# Patient Record
Sex: FEMALE | Race: BLACK OR AFRICAN AMERICAN | NOT HISPANIC OR LATINO
[De-identification: names, ages, dates, MRNs, and addresses within clinical notes are randomized per-mention and may not be internally consistent; named-entity substitution may affect disease eponyms.]

---

## 2020-03-10 PROBLEM — Z00.00 ENCOUNTER FOR PREVENTIVE HEALTH EXAMINATION: Status: ACTIVE | Noted: 2020-03-10

## 2020-03-11 ENCOUNTER — APPOINTMENT (OUTPATIENT)
Dept: BARIATRICS | Facility: CLINIC | Age: 45
End: 2020-03-11
Payer: MEDICAID

## 2020-03-11 VITALS
TEMPERATURE: 96.8 F | WEIGHT: 220.25 LBS | BODY MASS INDEX: 34.57 KG/M2 | HEART RATE: 91 BPM | SYSTOLIC BLOOD PRESSURE: 128 MMHG | DIASTOLIC BLOOD PRESSURE: 87 MMHG | HEIGHT: 67 IN | OXYGEN SATURATION: 97 %

## 2020-03-11 DIAGNOSIS — N39.3 STRESS INCONTINENCE (FEMALE) (MALE): ICD-10-CM

## 2020-03-11 DIAGNOSIS — M15.9 POLYOSTEOARTHRITIS, UNSPECIFIED: ICD-10-CM

## 2020-03-11 DIAGNOSIS — J45.20 MILD INTERMITTENT ASTHMA, UNCOMPLICATED: ICD-10-CM

## 2020-03-11 PROCEDURE — 99203 OFFICE O/P NEW LOW 30 MIN: CPT

## 2020-03-12 ENCOUNTER — APPOINTMENT (OUTPATIENT)
Dept: BARIATRICS | Facility: CLINIC | Age: 45
End: 2020-03-12

## 2020-03-23 ENCOUNTER — APPOINTMENT (OUTPATIENT)
Dept: BARIATRICS | Facility: CLINIC | Age: 45
End: 2020-03-23
Payer: MEDICAID

## 2020-03-23 PROCEDURE — 97803 MED NUTRITION INDIV SUBSEQ: CPT

## 2020-03-23 PROCEDURE — 98968 PH1 ASSMT&MGMT NQHP 21-30: CPT

## 2020-04-21 PROBLEM — N39.3 URINARY, INCONTINENCE, STRESS FEMALE: Status: ACTIVE | Noted: 2020-04-21

## 2020-04-21 PROBLEM — J45.20 MILD INTERMITTENT ASTHMA IN ADULT WITHOUT COMPLICATION: Status: ACTIVE | Noted: 2020-04-21

## 2020-04-21 PROBLEM — M15.9 OSTEOARTHRITIS OF MULTIPLE JOINTS, UNSPECIFIED OSTEOARTHRITIS TYPE: Status: ACTIVE | Noted: 2020-04-21

## 2020-04-21 NOTE — HISTORY OF PRESENT ILLNESS
[de-identified] : Patient is a 44 year old female with along history of morbid obesity not responsive to multiple dietary regimens. She has a BMI of 35 and weight-related comorbidities including hypertension, asthma, osteoarthritis and stress urinary incontinence. She is interested in weight loss surgery.

## 2020-04-21 NOTE — ASSESSMENT
[FreeTextEntry1] : She meets the NIH criteria for bariatric surgery and would like to have a laparoscopic sleeve gastrectomy. I have reviewed the risks and benefits of the procedure with the patient, and she understands this information fully.

## 2020-04-24 ENCOUNTER — APPOINTMENT (OUTPATIENT)
Dept: BARIATRICS | Facility: CLINIC | Age: 45
End: 2020-04-24
Payer: MEDICAID

## 2020-04-24 PROCEDURE — 98968 PH1 ASSMT&MGMT NQHP 21-30: CPT

## 2020-04-24 PROCEDURE — 97803 MED NUTRITION INDIV SUBSEQ: CPT

## 2020-05-01 ENCOUNTER — APPOINTMENT (OUTPATIENT)
Dept: BARIATRICS | Facility: CLINIC | Age: 45
End: 2020-05-01

## 2020-06-23 ENCOUNTER — APPOINTMENT (OUTPATIENT)
Dept: RADIOLOGY | Facility: HOSPITAL | Age: 45
End: 2020-06-23

## 2020-07-06 ENCOUNTER — RESULT REVIEW (OUTPATIENT)
Age: 45
End: 2020-07-06

## 2020-07-06 ENCOUNTER — LABORATORY RESULT (OUTPATIENT)
Age: 45
End: 2020-07-06

## 2020-07-06 ENCOUNTER — OUTPATIENT (OUTPATIENT)
Dept: OUTPATIENT SERVICES | Facility: HOSPITAL | Age: 45
LOS: 1 days | End: 2020-07-06
Payer: MEDICAID

## 2020-07-06 PROCEDURE — 71046 X-RAY EXAM CHEST 2 VIEWS: CPT

## 2020-07-06 PROCEDURE — 71046 X-RAY EXAM CHEST 2 VIEWS: CPT | Mod: 26

## 2020-07-06 PROCEDURE — 74240 X-RAY XM UPR GI TRC 1CNTRST: CPT

## 2020-07-06 PROCEDURE — 74240 X-RAY XM UPR GI TRC 1CNTRST: CPT | Mod: 26

## 2020-07-10 ENCOUNTER — APPOINTMENT (OUTPATIENT)
Dept: BARIATRICS | Facility: CLINIC | Age: 45
End: 2020-07-10

## 2020-07-14 ENCOUNTER — APPOINTMENT (OUTPATIENT)
Dept: ENDOCRINOLOGY | Facility: CLINIC | Age: 45
End: 2020-07-14
Payer: MEDICAID

## 2020-07-14 VITALS
SYSTOLIC BLOOD PRESSURE: 124 MMHG | HEIGHT: 66.5 IN | HEART RATE: 87 BPM | WEIGHT: 225 LBS | DIASTOLIC BLOOD PRESSURE: 81 MMHG | BODY MASS INDEX: 35.73 KG/M2

## 2020-07-14 DIAGNOSIS — E03.9 HYPOTHYROIDISM, UNSPECIFIED: ICD-10-CM

## 2020-07-14 DIAGNOSIS — Z78.9 OTHER SPECIFIED HEALTH STATUS: ICD-10-CM

## 2020-07-14 DIAGNOSIS — E04.9 NONTOXIC GOITER, UNSPECIFIED: ICD-10-CM

## 2020-07-14 DIAGNOSIS — Z87.891 PERSONAL HISTORY OF NICOTINE DEPENDENCE: ICD-10-CM

## 2020-07-14 PROCEDURE — 36415 COLL VENOUS BLD VENIPUNCTURE: CPT

## 2020-07-14 PROCEDURE — 99205 OFFICE O/P NEW HI 60 MIN: CPT | Mod: 25

## 2020-07-15 ENCOUNTER — APPOINTMENT (OUTPATIENT)
Dept: BARIATRICS | Facility: CLINIC | Age: 45
End: 2020-07-15
Payer: MEDICAID

## 2020-07-15 PROCEDURE — 98968 PH1 ASSMT&MGMT NQHP 21-30: CPT

## 2020-07-17 VITALS — BODY MASS INDEX: 35.89 KG/M2 | HEIGHT: 66.5 IN | WEIGHT: 226 LBS

## 2020-07-17 PROBLEM — Z87.891 FORMER SMOKER: Status: ACTIVE | Noted: 2020-07-17

## 2020-07-17 PROBLEM — Z78.9 ALCOHOL CONSUMPTION ONE TO TWO DAYS PER WEEK: Status: ACTIVE | Noted: 2020-07-17

## 2020-07-17 LAB
T3 SERPL-MCNC: 89 NG/DL
T4 FREE SERPL-MCNC: 0.5 NG/DL
THYROGLOB AB SERPL-ACNC: <20 IU/ML
THYROPEROXIDASE AB SERPL IA-ACNC: 238 IU/ML
TSH RECEPTOR AB: <1.1 IU/L
TSH SERPL-ACNC: 6.85 UIU/ML
TSI ACT/NOR SER: <0.1 IU/L

## 2020-07-17 RX ORDER — LEVOTHYROXINE SODIUM 0.15 MG/1
150 TABLET ORAL DAILY
Qty: 90 | Refills: 2 | Status: ACTIVE | COMMUNITY
Start: 2020-07-17 | End: 1900-01-01

## 2020-07-17 NOTE — PHYSICAL EXAM
[Alert] : alert [Well Nourished] : well nourished [No Acute Distress] : no acute distress [Well Developed] : well developed [Normal Sclera/Conjunctiva] : normal sclera/conjunctiva [EOMI] : extra ocular movement intact [No Proptosis] : no proptosis [Normal Oropharynx] : the oropharynx was normal [No LAD] : no lymphadenopathy [No Respiratory Distress] : no respiratory distress [No Accessory Muscle Use] : no accessory muscle use [Clear to Auscultation] : lungs were clear to auscultation bilaterally [Normal S1, S2] : normal S1 and S2 [No Edema] : no peripheral edema [Normal Rate] : heart rate was normal [Regular Rhythm] : with a regular rhythm [Pedal Pulses Normal] : the pedal pulses are present [Normal Bowel Sounds] : normal bowel sounds [Not Distended] : not distended [Soft] : abdomen soft [Not Tender] : non-tender [Normal Anterior Cervical Nodes] : no anterior cervical lymphadenopathy [Normal Posterior Cervical Nodes] : no posterior cervical lymphadenopathy [No Spinal Tenderness] : no spinal tenderness [Spine Straight] : spine straight [No Stigmata of Cushings Syndrome] : no stigmata of Cushings Syndrome [Normal Gait] : normal gait [Normal Strength/Tone] : muscle strength and tone were normal [No Rash] : no rash [Acanthosis Nigricans] : no acanthosis nigricans [Normal Reflexes] : deep tendon reflexes were 2+ and symmetric [No Tremors] : no tremors [Oriented x3] : oriented to person, place, and time [de-identified] : palpable non tender diffuse goiter

## 2020-07-17 NOTE — HISTORY OF PRESENT ILLNESS
[FreeTextEntry1] : 43 y/o F w/ Hx of obesity and HTN\par here for initial evaluation and management of thyroid complaints\par generally feels well and endorses no acute complaints.\par reports dramatic weight gain of ~ 60 lb over the last 6 years. reports no clear trigger. notes physical activity has redued over the pandemic. is presently undergoing bariatric surgery evaluation. No TFTs available for interpretation. notes she has been told about having a goiter in the past. She otherwise denies any f/c, CP, SOB, palpitations, tremors, depressed mood, anxiety, palpitations, n/v, stool/urinary abn, skin/weight changes, heat/cold intolerance, HAs, breast/nipple changes, polyuria/polydipsia/nocturia or other complaints.\par she again denies any dysphagia, hoarseness, neck tenderness or new palpable masses. she again denies any family history of thyroid disorders or personal exposure to ionizing radiation.\par \par

## 2020-07-17 NOTE — ASSESSMENT
[FreeTextEntry1] : Abnormal TFTs\par no recent amiodarone/steroid/lithium or contrast exposure. denies biotin intake. repeat TFTs and antibody levels. refer for dedicated US.\par \par 1) Obesity, Morbid: Class I, complicated by severe DJD. High risk of metabolic syndrome and future complications. Discussed options including meds, bariatric surgery and lifestyle modification. RB and alternatives discussed. Questions answered and she verbalized understanding. Refer to nutrition and start hypocaloric, hypocarb diet in addition to exercise regimen. Refer to  now. Some weight loss (3 lbs). If no 5-7% weight loss observed on f/u, will consider belviq initiation.\par

## 2020-07-21 ENCOUNTER — FORM ENCOUNTER (OUTPATIENT)
Age: 45
End: 2020-07-21

## 2020-08-12 ENCOUNTER — APPOINTMENT (OUTPATIENT)
Dept: BARIATRICS | Facility: CLINIC | Age: 45
End: 2020-08-12
Payer: MEDICAID

## 2020-08-12 VITALS
HEIGHT: 66.5 IN | HEART RATE: 91 BPM | SYSTOLIC BLOOD PRESSURE: 120 MMHG | DIASTOLIC BLOOD PRESSURE: 85 MMHG | BODY MASS INDEX: 35.89 KG/M2 | WEIGHT: 226 LBS | OXYGEN SATURATION: 96 % | TEMPERATURE: 97.6 F

## 2020-08-12 PROCEDURE — 99213 OFFICE O/P EST LOW 20 MIN: CPT

## 2020-08-12 NOTE — HISTORY OF PRESENT ILLNESS
[de-identified] : Patient is scheduled for a laparoscopic sleeve gastrectomy. She has been medically cleared and her upper GI study is normal.

## 2020-08-12 NOTE — PHYSICAL EXAM
[Obese, well nourished, in no acute distress] : obese, well nourished, in no acute distress [Normal] : affect appropriate [de-identified] : dastasis recti. well healed lower midline scar

## 2020-08-12 NOTE — ASSESSMENT
[FreeTextEntry1] : I have reviewed the risks and benefits of the procedure with the patient, and she understands this information fully.

## 2020-08-15 ENCOUNTER — LABORATORY RESULT (OUTPATIENT)
Age: 45
End: 2020-08-15

## 2020-08-17 ENCOUNTER — TRANSCRIPTION ENCOUNTER (OUTPATIENT)
Age: 45
End: 2020-08-17

## 2020-08-17 VITALS
DIASTOLIC BLOOD PRESSURE: 78 MMHG | SYSTOLIC BLOOD PRESSURE: 123 MMHG | HEART RATE: 85 BPM | OXYGEN SATURATION: 96 % | RESPIRATION RATE: 16 BRPM | WEIGHT: 228.62 LBS | HEIGHT: 67 IN | TEMPERATURE: 99 F

## 2020-08-17 RX ORDER — ENOXAPARIN SODIUM 100 MG/ML
40 INJECTION SUBCUTANEOUS ONCE
Refills: 0 | Status: COMPLETED | OUTPATIENT
Start: 2020-08-18 | End: 2020-08-18

## 2020-08-18 ENCOUNTER — APPOINTMENT (OUTPATIENT)
Dept: BARIATRICS | Facility: HOSPITAL | Age: 45
End: 2020-08-18

## 2020-08-18 ENCOUNTER — RESULT REVIEW (OUTPATIENT)
Age: 45
End: 2020-08-18

## 2020-08-18 ENCOUNTER — INPATIENT (INPATIENT)
Facility: HOSPITAL | Age: 45
LOS: 4 days | Discharge: HOME | DRG: 621 | End: 2020-08-23
Attending: SURGERY | Admitting: SURGERY
Payer: MEDICAID

## 2020-08-18 DIAGNOSIS — Z41.9 ENCOUNTER FOR PROCEDURE FOR PURPOSES OTHER THAN REMEDYING HEALTH STATE, UNSPECIFIED: Chronic | ICD-10-CM

## 2020-08-18 DIAGNOSIS — Z98.891 HISTORY OF UTERINE SCAR FROM PREVIOUS SURGERY: Chronic | ICD-10-CM

## 2020-08-18 DIAGNOSIS — Z90.710 ACQUIRED ABSENCE OF BOTH CERVIX AND UTERUS: Chronic | ICD-10-CM

## 2020-08-18 DIAGNOSIS — Z98.890 OTHER SPECIFIED POSTPROCEDURAL STATES: Chronic | ICD-10-CM

## 2020-08-18 PROCEDURE — 88307 TISSUE EXAM BY PATHOLOGIST: CPT | Mod: 26

## 2020-08-18 PROCEDURE — 43775 LAP SLEEVE GASTRECTOMY: CPT | Mod: GC

## 2020-08-18 RX ORDER — ACETAMINOPHEN 500 MG
1000 TABLET ORAL ONCE
Refills: 0 | Status: COMPLETED | OUTPATIENT
Start: 2020-08-18 | End: 2020-08-20

## 2020-08-18 RX ORDER — SCOPALAMINE 1 MG/3D
1 PATCH, EXTENDED RELEASE TRANSDERMAL ONCE
Refills: 0 | Status: COMPLETED | OUTPATIENT
Start: 2020-08-18 | End: 2020-08-18

## 2020-08-18 RX ORDER — HYDROMORPHONE HYDROCHLORIDE 2 MG/ML
0.5 INJECTION INTRAMUSCULAR; INTRAVENOUS; SUBCUTANEOUS ONCE
Refills: 0 | Status: DISCONTINUED | OUTPATIENT
Start: 2020-08-18 | End: 2020-08-19

## 2020-08-18 RX ORDER — PANTOPRAZOLE SODIUM 20 MG/1
40 TABLET, DELAYED RELEASE ORAL DAILY
Refills: 0 | Status: DISCONTINUED | OUTPATIENT
Start: 2020-08-18 | End: 2020-08-23

## 2020-08-18 RX ORDER — GABAPENTIN 400 MG/1
300 CAPSULE ORAL ONCE
Refills: 0 | Status: COMPLETED | OUTPATIENT
Start: 2020-08-18 | End: 2020-08-18

## 2020-08-18 RX ORDER — APREPITANT 80 MG/1
40 CAPSULE ORAL ONCE
Refills: 0 | Status: COMPLETED | OUTPATIENT
Start: 2020-08-18 | End: 2020-08-18

## 2020-08-18 RX ORDER — ONDANSETRON 8 MG/1
4 TABLET, FILM COATED ORAL EVERY 6 HOURS
Refills: 0 | Status: DISCONTINUED | OUTPATIENT
Start: 2020-08-18 | End: 2020-08-23

## 2020-08-18 RX ORDER — AMLODIPINE BESYLATE 2.5 MG/1
1 TABLET ORAL
Qty: 0 | Refills: 0 | DISCHARGE

## 2020-08-18 RX ORDER — IPRATROPIUM/ALBUTEROL SULFATE 18-103MCG
0 AEROSOL WITH ADAPTER (GRAM) INHALATION
Qty: 0 | Refills: 0 | DISCHARGE

## 2020-08-18 RX ORDER — ACETAMINOPHEN 500 MG
1000 TABLET ORAL ONCE
Refills: 0 | Status: COMPLETED | OUTPATIENT
Start: 2020-08-18 | End: 2020-08-18

## 2020-08-18 RX ORDER — ALBUTEROL 90 UG/1
0 AEROSOL, METERED ORAL
Qty: 0 | Refills: 0 | DISCHARGE

## 2020-08-18 RX ORDER — BUPIVACAINE 13.3 MG/ML
20 INJECTION, SUSPENSION, LIPOSOMAL INFILTRATION ONCE
Refills: 0 | Status: DISCONTINUED | OUTPATIENT
Start: 2020-08-18 | End: 2020-08-18

## 2020-08-18 RX ORDER — OXYCODONE HYDROCHLORIDE 5 MG/1
0 TABLET ORAL
Qty: 0 | Refills: 0 | DISCHARGE

## 2020-08-18 RX ORDER — SODIUM CHLORIDE 9 MG/ML
1000 INJECTION, SOLUTION INTRAVENOUS
Refills: 0 | Status: DISCONTINUED | OUTPATIENT
Start: 2020-08-18 | End: 2020-08-19

## 2020-08-18 RX ORDER — ACETAMINOPHEN 500 MG
650 TABLET ORAL EVERY 6 HOURS
Refills: 0 | Status: DISCONTINUED | OUTPATIENT
Start: 2020-08-18 | End: 2020-08-23

## 2020-08-18 RX ORDER — HYDROMORPHONE HYDROCHLORIDE 2 MG/ML
0.25 INJECTION INTRAMUSCULAR; INTRAVENOUS; SUBCUTANEOUS
Refills: 0 | Status: DISCONTINUED | OUTPATIENT
Start: 2020-08-18 | End: 2020-08-18

## 2020-08-18 RX ORDER — LEVOTHYROXINE SODIUM 125 MCG
1 TABLET ORAL
Qty: 0 | Refills: 0 | DISCHARGE

## 2020-08-18 RX ADMIN — ENOXAPARIN SODIUM 40 MILLIGRAM(S): 100 INJECTION SUBCUTANEOUS at 15:06

## 2020-08-18 RX ADMIN — HYDROMORPHONE HYDROCHLORIDE 0.25 MILLIGRAM(S): 2 INJECTION INTRAMUSCULAR; INTRAVENOUS; SUBCUTANEOUS at 20:15

## 2020-08-18 RX ADMIN — Medication 400 MILLIGRAM(S): at 22:32

## 2020-08-18 RX ADMIN — SODIUM CHLORIDE 150 MILLILITER(S): 9 INJECTION, SOLUTION INTRAVENOUS at 21:27

## 2020-08-18 RX ADMIN — Medication 1000 MILLIGRAM(S): at 13:11

## 2020-08-18 RX ADMIN — SCOPALAMINE 1 PATCH: 1 PATCH, EXTENDED RELEASE TRANSDERMAL at 13:12

## 2020-08-18 RX ADMIN — APREPITANT 40 MILLIGRAM(S): 80 CAPSULE ORAL at 13:11

## 2020-08-18 RX ADMIN — GABAPENTIN 300 MILLIGRAM(S): 400 CAPSULE ORAL at 13:12

## 2020-08-18 RX ADMIN — PANTOPRAZOLE SODIUM 40 MILLIGRAM(S): 20 TABLET, DELAYED RELEASE ORAL at 21:31

## 2020-08-18 RX ADMIN — SCOPALAMINE 1 PATCH: 1 PATCH, EXTENDED RELEASE TRANSDERMAL at 20:22

## 2020-08-18 RX ADMIN — Medication 1000 MILLIGRAM(S): at 13:49

## 2020-08-18 RX ADMIN — Medication 1000 MILLIGRAM(S): at 23:32

## 2020-08-18 RX ADMIN — HYDROMORPHONE HYDROCHLORIDE 0.25 MILLIGRAM(S): 2 INJECTION INTRAMUSCULAR; INTRAVENOUS; SUBCUTANEOUS at 20:30

## 2020-08-18 RX ADMIN — ONDANSETRON 4 MILLIGRAM(S): 8 TABLET, FILM COATED ORAL at 20:44

## 2020-08-18 NOTE — H&P ADULT - NSHPPHYSICALEXAM_GEN_ALL_CORE
Neuro: Alert and Oriented X 4  Respiratory: CTA b/l. no respiratory distress  Cardiovascular: NSR, RRR  Gastrointestinal: obese, soft, NT/ND  Extremities: (-) edema b/l

## 2020-08-18 NOTE — H&P ADULT - HISTORY OF PRESENT ILLNESS
Pt is a 43 y/o F w/ PMH of hypothyroidism, HTN, asthma and Morbid obesity presents today for robotic assisted sleeve gastrectomy.     pre op h/h: 13.0/39.5  Cr: .77

## 2020-08-18 NOTE — H&P ADULT - NSICDXPASTMEDICALHX_GEN_ALL_CORE_FT
PAST MEDICAL HISTORY:  Acute cystitis     Alcohol abuse pt denies    Asthma     Back pain     Genital herpes pt denies    HTN (hypertension)     Hypothyroid     Migraines     Morbid obesity     OA (osteoarthritis)     Uterine fibroid

## 2020-08-18 NOTE — H&P ADULT - ASSESSMENT
Pt is a 43 y/o F w/ PMH of hypothyroidism, HTN, asthma and Morbid obesity presents today for robotic assisted sleeve gastrectomy.   - to OR

## 2020-08-18 NOTE — BRIEF OPERATIVE NOTE - OPERATION/FINDINGS
Stomach divided along Bougie edge using robotic stapler. Omentum sutured to greater curvature of sleeved stomach. Hemostasis achieved. Fascia at 15mm post site closed. Port sites closed w/ 4-0 Monocryl sutures & surgical glue.

## 2020-08-18 NOTE — PROGRESS NOTE ADULT - SUBJECTIVE AND OBJECTIVE BOX
POST-OPERATIVE NOTE    Procedure: Laparoscopic sleeve gastrectomy    Diagnosis/Indication: morbid obesity    Surgeon: Dr. Kowalski    S: Pt c/o pain in the epigastric region. Denies CP, and SOB. Pain controlled with Tylenol only due to the fact that patient was difficult to arouse in PACU (sternal rub). Dilaudid dc'd. Denies nausea, vomiting.    O:  T(C): 36.2 (08-18-20 @ 21:05), Max: 36.8 (08-18-20 @ 19:50)  T(F): 97.1 (08-18-20 @ 21:05), Max: 98.2 (08-18-20 @ 19:50)  HR: 80 (08-18-20 @ 21:20) (71 - 85)  BP: 124/70 (08-18-20 @ 21:20) (113/61 - 128/67)  RR: 14 (08-18-20 @ 21:20) (13 - 21)  SpO2: 98% (08-18-20 @ 21:20) (95% - 100%)  Wt(kg): --    Gen: NAD, resting comfortably in bed  C/V: NSR  Pulm: Nonlabored breathing, no respiratory distress  Abd: soft, NT/ND, incision areas are clean, dry and intact  Extrem: WWP, no calf edema or tenderness, SCDs in place    A/P: 44y Female s/p above procedure  Diet: BCLD  IVF: LR @ 150cc/hr  Pain/nausea control  SCDs/OOBA/IS  Dispo pending pain control, PO tolerance, clinical improvement POST-OPERATIVE NOTE    Procedure: Laparoscopic sleeve gastrectomy    Diagnosis/Indication: morbid obesity    Surgeon: Dr. Kowalski    S: Pt c/o pain in the epigastric region. Denies CP, and SOB. Pain controlled with Tylenol only, due to the fact that patient was difficult to arouse in PACU (sternal rub). Dilaudid dc'd. Denies nausea, vomiting.    O:  T(C): 36.2 (08-18-20 @ 21:05), Max: 36.8 (08-18-20 @ 19:50)  T(F): 97.1 (08-18-20 @ 21:05), Max: 98.2 (08-18-20 @ 19:50)  HR: 80 (08-18-20 @ 21:20) (71 - 85)  BP: 124/70 (08-18-20 @ 21:20) (113/61 - 128/67)  RR: 14 (08-18-20 @ 21:20) (13 - 21)  SpO2: 98% (08-18-20 @ 21:20) (95% - 100%)  Wt(kg): --    Gen: NAD, resting comfortably in bed  C/V: NSR  Pulm: Nonlabored breathing, no respiratory distress  Abd: soft, NT/ND, incision areas are clean, dry and intact  Extrem: WWP, no calf edema or tenderness, SCDs in place    A/P: 44y Female s/p above procedure  Diet: BCLD  IVF: LR @ 150cc/hr  Pain/nausea control  SCDs/OOBA/IS  Dispo pending pain control, PO tolerance, clinical improvement

## 2020-08-18 NOTE — H&P ADULT - NSICDXPASTSURGICALHX_GEN_ALL_CORE_FT
PAST SURGICAL HISTORY:  S/P  x3    S/P hysterectomy partial    S/P myomectomy     Surgery, elective total hip replacement, left    Surgery, elective b/l breast reduction x2

## 2020-08-18 NOTE — PACU DISCHARGE NOTE - COMMENTS
Patient is A&OX4. Still with some discomfort from gas. VSS. Tolerated PO clear. Met PACU requirements. Seen by PA and plan of care discussed with patient. LR at 150cc/hr. Voided 650cc. clear urine. Abdomen soft with 6 lap sites closed with dermabond. Report given to floor RN. Patient taken down to room in NAD by transporter

## 2020-08-19 ENCOUNTER — TRANSCRIPTION ENCOUNTER (OUTPATIENT)
Age: 45
End: 2020-08-19

## 2020-08-19 LAB
ALBUMIN SERPL ELPH-MCNC: 3.8 G/DL — SIGNIFICANT CHANGE UP (ref 3.3–5)
ALP SERPL-CCNC: 39 U/L — LOW (ref 40–120)
ALT FLD-CCNC: 51 U/L — HIGH (ref 10–45)
ANION GAP SERPL CALC-SCNC: 11 MMOL/L — SIGNIFICANT CHANGE UP (ref 5–17)
ANION GAP SERPL CALC-SCNC: 12 MMOL/L — SIGNIFICANT CHANGE UP (ref 5–17)
ANION GAP SERPL CALC-SCNC: 13 MMOL/L — SIGNIFICANT CHANGE UP (ref 5–17)
APTT BLD: 23.5 SEC — LOW (ref 27.5–35.5)
AST SERPL-CCNC: 65 U/L — HIGH (ref 10–40)
BILIRUB SERPL-MCNC: 0.4 MG/DL — SIGNIFICANT CHANGE UP (ref 0.2–1.2)
BUN SERPL-MCNC: 10 MG/DL — SIGNIFICANT CHANGE UP (ref 7–23)
BUN SERPL-MCNC: 7 MG/DL — SIGNIFICANT CHANGE UP (ref 7–23)
BUN SERPL-MCNC: 8 MG/DL — SIGNIFICANT CHANGE UP (ref 7–23)
CALCIUM SERPL-MCNC: 8.7 MG/DL — SIGNIFICANT CHANGE UP (ref 8.4–10.5)
CALCIUM SERPL-MCNC: 8.9 MG/DL — SIGNIFICANT CHANGE UP (ref 8.4–10.5)
CALCIUM SERPL-MCNC: 9 MG/DL — SIGNIFICANT CHANGE UP (ref 8.4–10.5)
CHLORIDE SERPL-SCNC: 100 MMOL/L — SIGNIFICANT CHANGE UP (ref 96–108)
CHLORIDE SERPL-SCNC: 99 MMOL/L — SIGNIFICANT CHANGE UP (ref 96–108)
CHLORIDE SERPL-SCNC: 99 MMOL/L — SIGNIFICANT CHANGE UP (ref 96–108)
CO2 SERPL-SCNC: 19 MMOL/L — LOW (ref 22–31)
CO2 SERPL-SCNC: 19 MMOL/L — LOW (ref 22–31)
CO2 SERPL-SCNC: 22 MMOL/L — SIGNIFICANT CHANGE UP (ref 22–31)
CREAT SERPL-MCNC: 0.52 MG/DL — SIGNIFICANT CHANGE UP (ref 0.5–1.3)
CREAT SERPL-MCNC: 0.65 MG/DL — SIGNIFICANT CHANGE UP (ref 0.5–1.3)
CREAT SERPL-MCNC: 0.76 MG/DL — SIGNIFICANT CHANGE UP (ref 0.5–1.3)
GLUCOSE BLDC GLUCOMTR-MCNC: 163 MG/DL — HIGH (ref 70–99)
GLUCOSE SERPL-MCNC: 146 MG/DL — HIGH (ref 70–99)
GLUCOSE SERPL-MCNC: 156 MG/DL — HIGH (ref 70–99)
GLUCOSE SERPL-MCNC: 171 MG/DL — HIGH (ref 70–99)
HCT VFR BLD CALC: 25.7 % — LOW (ref 34.5–45)
HCT VFR BLD CALC: 25.8 % — LOW (ref 34.5–45)
HCT VFR BLD CALC: 27.9 % — LOW (ref 34.5–45)
HCT VFR BLD CALC: 28.3 % — LOW (ref 34.5–45)
HCT VFR BLD CALC: 31.1 % — LOW (ref 34.5–45)
HGB BLD-MCNC: 10.3 G/DL — LOW (ref 11.5–15.5)
HGB BLD-MCNC: 8.4 G/DL — LOW (ref 11.5–15.5)
HGB BLD-MCNC: 8.7 G/DL — LOW (ref 11.5–15.5)
HGB BLD-MCNC: 9.3 G/DL — LOW (ref 11.5–15.5)
HGB BLD-MCNC: 9.7 G/DL — LOW (ref 11.5–15.5)
INR BLD: 1.08 — SIGNIFICANT CHANGE UP (ref 0.88–1.16)
LACTATE SERPL-SCNC: 1.7 MMOL/L — SIGNIFICANT CHANGE UP (ref 0.5–2)
LACTATE SERPL-SCNC: 2.7 MMOL/L — HIGH (ref 0.5–2)
MAGNESIUM SERPL-MCNC: 1.7 MG/DL — SIGNIFICANT CHANGE UP (ref 1.6–2.6)
MAGNESIUM SERPL-MCNC: 1.8 MG/DL — SIGNIFICANT CHANGE UP (ref 1.6–2.6)
MAGNESIUM SERPL-MCNC: 2.6 MG/DL — SIGNIFICANT CHANGE UP (ref 1.6–2.6)
MCHC RBC-ENTMCNC: 29.6 PG — SIGNIFICANT CHANGE UP (ref 27–34)
MCHC RBC-ENTMCNC: 29.8 PG — SIGNIFICANT CHANGE UP (ref 27–34)
MCHC RBC-ENTMCNC: 30.3 PG — SIGNIFICANT CHANGE UP (ref 27–34)
MCHC RBC-ENTMCNC: 30.4 PG — SIGNIFICANT CHANGE UP (ref 27–34)
MCHC RBC-ENTMCNC: 30.9 PG — SIGNIFICANT CHANGE UP (ref 27–34)
MCHC RBC-ENTMCNC: 32.7 GM/DL — SIGNIFICANT CHANGE UP (ref 32–36)
MCHC RBC-ENTMCNC: 33.1 GM/DL — SIGNIFICANT CHANGE UP (ref 32–36)
MCHC RBC-ENTMCNC: 33.3 GM/DL — SIGNIFICANT CHANGE UP (ref 32–36)
MCHC RBC-ENTMCNC: 33.7 GM/DL — SIGNIFICANT CHANGE UP (ref 32–36)
MCHC RBC-ENTMCNC: 34.3 GM/DL — SIGNIFICANT CHANGE UP (ref 32–36)
MCV RBC AUTO: 87.1 FL — SIGNIFICANT CHANGE UP (ref 80–100)
MCV RBC AUTO: 89.4 FL — SIGNIFICANT CHANGE UP (ref 80–100)
MCV RBC AUTO: 90.2 FL — SIGNIFICANT CHANGE UP (ref 80–100)
MCV RBC AUTO: 92.7 FL — SIGNIFICANT CHANGE UP (ref 80–100)
MCV RBC AUTO: 92.8 FL — SIGNIFICANT CHANGE UP (ref 80–100)
NRBC # BLD: 0 /100 WBCS — SIGNIFICANT CHANGE UP (ref 0–0)
PHOSPHATE SERPL-MCNC: 2.6 MG/DL — SIGNIFICANT CHANGE UP (ref 2.5–4.5)
PHOSPHATE SERPL-MCNC: 2.8 MG/DL — SIGNIFICANT CHANGE UP (ref 2.5–4.5)
PHOSPHATE SERPL-MCNC: 3.1 MG/DL — SIGNIFICANT CHANGE UP (ref 2.5–4.5)
PLATELET # BLD AUTO: 178 K/UL — SIGNIFICANT CHANGE UP (ref 150–400)
PLATELET # BLD AUTO: 194 K/UL — SIGNIFICANT CHANGE UP (ref 150–400)
PLATELET # BLD AUTO: 216 K/UL — SIGNIFICANT CHANGE UP (ref 150–400)
PLATELET # BLD AUTO: 219 K/UL — SIGNIFICANT CHANGE UP (ref 150–400)
PLATELET # BLD AUTO: 222 K/UL — SIGNIFICANT CHANGE UP (ref 150–400)
POTASSIUM SERPL-MCNC: 4.4 MMOL/L — SIGNIFICANT CHANGE UP (ref 3.5–5.3)
POTASSIUM SERPL-MCNC: 4.5 MMOL/L — SIGNIFICANT CHANGE UP (ref 3.5–5.3)
POTASSIUM SERPL-MCNC: 4.6 MMOL/L — SIGNIFICANT CHANGE UP (ref 3.5–5.3)
POTASSIUM SERPL-SCNC: 4.4 MMOL/L — SIGNIFICANT CHANGE UP (ref 3.5–5.3)
POTASSIUM SERPL-SCNC: 4.5 MMOL/L — SIGNIFICANT CHANGE UP (ref 3.5–5.3)
POTASSIUM SERPL-SCNC: 4.6 MMOL/L — SIGNIFICANT CHANGE UP (ref 3.5–5.3)
PROT SERPL-MCNC: 6.6 G/DL — SIGNIFICANT CHANGE UP (ref 6–8.3)
PROTHROM AB SERPL-ACNC: 12.9 SEC — SIGNIFICANT CHANGE UP (ref 10.6–13.6)
RBC # BLD: 2.77 M/UL — LOW (ref 3.8–5.2)
RBC # BLD: 2.86 M/UL — LOW (ref 3.8–5.2)
RBC # BLD: 3.01 M/UL — LOW (ref 3.8–5.2)
RBC # BLD: 3.25 M/UL — LOW (ref 3.8–5.2)
RBC # BLD: 3.48 M/UL — LOW (ref 3.8–5.2)
RBC # FLD: 13.6 % — SIGNIFICANT CHANGE UP (ref 10.3–14.5)
RBC # FLD: 13.8 % — SIGNIFICANT CHANGE UP (ref 10.3–14.5)
RBC # FLD: 14 % — SIGNIFICANT CHANGE UP (ref 10.3–14.5)
RBC # FLD: 14 % — SIGNIFICANT CHANGE UP (ref 10.3–14.5)
RBC # FLD: 14.1 % — SIGNIFICANT CHANGE UP (ref 10.3–14.5)
SODIUM SERPL-SCNC: 130 MMOL/L — LOW (ref 135–145)
SODIUM SERPL-SCNC: 131 MMOL/L — LOW (ref 135–145)
SODIUM SERPL-SCNC: 133 MMOL/L — LOW (ref 135–145)
WBC # BLD: 10.08 K/UL — SIGNIFICANT CHANGE UP (ref 3.8–10.5)
WBC # BLD: 10.23 K/UL — SIGNIFICANT CHANGE UP (ref 3.8–10.5)
WBC # BLD: 6.85 K/UL — SIGNIFICANT CHANGE UP (ref 3.8–10.5)
WBC # BLD: 9.3 K/UL — SIGNIFICANT CHANGE UP (ref 3.8–10.5)
WBC # BLD: 9.61 K/UL — SIGNIFICANT CHANGE UP (ref 3.8–10.5)
WBC # FLD AUTO: 10.08 K/UL — SIGNIFICANT CHANGE UP (ref 3.8–10.5)
WBC # FLD AUTO: 10.23 K/UL — SIGNIFICANT CHANGE UP (ref 3.8–10.5)
WBC # FLD AUTO: 6.85 K/UL — SIGNIFICANT CHANGE UP (ref 3.8–10.5)
WBC # FLD AUTO: 9.3 K/UL — SIGNIFICANT CHANGE UP (ref 3.8–10.5)
WBC # FLD AUTO: 9.61 K/UL — SIGNIFICANT CHANGE UP (ref 3.8–10.5)

## 2020-08-19 PROCEDURE — 93010 ELECTROCARDIOGRAM REPORT: CPT

## 2020-08-19 PROCEDURE — 71045 X-RAY EXAM CHEST 1 VIEW: CPT | Mod: 26

## 2020-08-19 RX ORDER — VALACYCLOVIR 500 MG/1
500 TABLET, FILM COATED ORAL
Refills: 0 | Status: DISCONTINUED | OUTPATIENT
Start: 2020-08-19 | End: 2020-08-19

## 2020-08-19 RX ORDER — METOCLOPRAMIDE HCL 10 MG
10 TABLET ORAL ONCE
Refills: 0 | Status: COMPLETED | OUTPATIENT
Start: 2020-08-19 | End: 2020-08-19

## 2020-08-19 RX ORDER — IPRATROPIUM/ALBUTEROL SULFATE 18-103MCG
3 AEROSOL WITH ADAPTER (GRAM) INHALATION ONCE
Refills: 0 | Status: COMPLETED | OUTPATIENT
Start: 2020-08-19 | End: 2020-08-19

## 2020-08-19 RX ORDER — SODIUM CHLORIDE 9 MG/ML
1000 INJECTION INTRAMUSCULAR; INTRAVENOUS; SUBCUTANEOUS
Refills: 0 | Status: DISCONTINUED | OUTPATIENT
Start: 2020-08-19 | End: 2020-08-22

## 2020-08-19 RX ORDER — LEVOTHYROXINE SODIUM 125 MCG
150 TABLET ORAL DAILY
Refills: 0 | Status: DISCONTINUED | OUTPATIENT
Start: 2020-08-19 | End: 2020-08-23

## 2020-08-19 RX ORDER — ONDANSETRON 8 MG/1
4 TABLET, FILM COATED ORAL ONCE
Refills: 0 | Status: COMPLETED | OUTPATIENT
Start: 2020-08-19 | End: 2020-08-19

## 2020-08-19 RX ORDER — SODIUM CHLORIDE 9 MG/ML
1000 INJECTION INTRAMUSCULAR; INTRAVENOUS; SUBCUTANEOUS
Refills: 0 | Status: DISCONTINUED | OUTPATIENT
Start: 2020-08-19 | End: 2020-08-19

## 2020-08-19 RX ORDER — OXYCODONE HYDROCHLORIDE 5 MG/1
30 TABLET ORAL EVERY 6 HOURS
Refills: 0 | Status: DISCONTINUED | OUTPATIENT
Start: 2020-08-19 | End: 2020-08-23

## 2020-08-19 RX ORDER — LIDOCAINE 4 G/100G
1 CREAM TOPICAL EVERY 24 HOURS
Refills: 0 | Status: DISCONTINUED | OUTPATIENT
Start: 2020-08-19 | End: 2020-08-23

## 2020-08-19 RX ORDER — IPRATROPIUM/ALBUTEROL SULFATE 18-103MCG
3 AEROSOL WITH ADAPTER (GRAM) INHALATION EVERY 6 HOURS
Refills: 0 | Status: DISCONTINUED | OUTPATIENT
Start: 2020-08-19 | End: 2020-08-23

## 2020-08-19 RX ORDER — HYDROMORPHONE HYDROCHLORIDE 2 MG/ML
0.5 INJECTION INTRAMUSCULAR; INTRAVENOUS; SUBCUTANEOUS ONCE
Refills: 0 | Status: DISCONTINUED | OUTPATIENT
Start: 2020-08-19 | End: 2020-08-19

## 2020-08-19 RX ORDER — VALACYCLOVIR 500 MG/1
500 TABLET, FILM COATED ORAL
Refills: 0 | Status: COMPLETED | OUTPATIENT
Start: 2020-08-19 | End: 2020-08-22

## 2020-08-19 RX ORDER — MAGNESIUM SULFATE 500 MG/ML
2 VIAL (ML) INJECTION ONCE
Refills: 0 | Status: COMPLETED | OUTPATIENT
Start: 2020-08-19 | End: 2020-08-19

## 2020-08-19 RX ADMIN — LIDOCAINE 1 PATCH: 4 CREAM TOPICAL at 19:18

## 2020-08-19 RX ADMIN — Medication 650 MILLIGRAM(S): at 13:03

## 2020-08-19 RX ADMIN — HYDROMORPHONE HYDROCHLORIDE 0.5 MILLIGRAM(S): 2 INJECTION INTRAMUSCULAR; INTRAVENOUS; SUBCUTANEOUS at 00:08

## 2020-08-19 RX ADMIN — HYDROMORPHONE HYDROCHLORIDE 0.5 MILLIGRAM(S): 2 INJECTION INTRAMUSCULAR; INTRAVENOUS; SUBCUTANEOUS at 09:53

## 2020-08-19 RX ADMIN — HYDROMORPHONE HYDROCHLORIDE 0.5 MILLIGRAM(S): 2 INJECTION INTRAMUSCULAR; INTRAVENOUS; SUBCUTANEOUS at 00:22

## 2020-08-19 RX ADMIN — Medication 650 MILLIGRAM(S): at 17:50

## 2020-08-19 RX ADMIN — Medication 50 GRAM(S): at 09:53

## 2020-08-19 RX ADMIN — ONDANSETRON 4 MILLIGRAM(S): 8 TABLET, FILM COATED ORAL at 00:09

## 2020-08-19 RX ADMIN — VALACYCLOVIR 500 MILLIGRAM(S): 500 TABLET, FILM COATED ORAL at 15:01

## 2020-08-19 RX ADMIN — Medication 650 MILLIGRAM(S): at 06:30

## 2020-08-19 RX ADMIN — Medication 650 MILLIGRAM(S): at 18:50

## 2020-08-19 RX ADMIN — Medication 650 MILLIGRAM(S): at 22:31

## 2020-08-19 RX ADMIN — HYDROMORPHONE HYDROCHLORIDE 0.5 MILLIGRAM(S): 2 INJECTION INTRAMUSCULAR; INTRAVENOUS; SUBCUTANEOUS at 10:59

## 2020-08-19 RX ADMIN — ONDANSETRON 4 MILLIGRAM(S): 8 TABLET, FILM COATED ORAL at 05:40

## 2020-08-19 RX ADMIN — Medication 650 MILLIGRAM(S): at 23:30

## 2020-08-19 RX ADMIN — SODIUM CHLORIDE 70 MILLILITER(S): 9 INJECTION INTRAMUSCULAR; INTRAVENOUS; SUBCUTANEOUS at 21:48

## 2020-08-19 RX ADMIN — Medication 10 MILLIGRAM(S): at 02:30

## 2020-08-19 RX ADMIN — PANTOPRAZOLE SODIUM 40 MILLIGRAM(S): 20 TABLET, DELAYED RELEASE ORAL at 13:04

## 2020-08-19 RX ADMIN — LIDOCAINE 1 PATCH: 4 CREAM TOPICAL at 17:50

## 2020-08-19 RX ADMIN — Medication 650 MILLIGRAM(S): at 14:03

## 2020-08-19 RX ADMIN — OXYCODONE HYDROCHLORIDE 30 MILLIGRAM(S): 5 TABLET ORAL at 17:56

## 2020-08-19 RX ADMIN — Medication 650 MILLIGRAM(S): at 05:30

## 2020-08-19 RX ADMIN — SCOPALAMINE 1 PATCH: 1 PATCH, EXTENDED RELEASE TRANSDERMAL at 19:18

## 2020-08-19 RX ADMIN — OXYCODONE HYDROCHLORIDE 30 MILLIGRAM(S): 5 TABLET ORAL at 16:56

## 2020-08-19 RX ADMIN — SODIUM CHLORIDE 150 MILLILITER(S): 9 INJECTION INTRAMUSCULAR; INTRAVENOUS; SUBCUTANEOUS at 09:55

## 2020-08-19 RX ADMIN — Medication 3 MILLILITER(S): at 13:04

## 2020-08-19 RX ADMIN — SCOPALAMINE 1 PATCH: 1 PATCH, EXTENDED RELEASE TRANSDERMAL at 06:52

## 2020-08-19 NOTE — DISCHARGE NOTE PROVIDER - NSDCCPGOAL_GEN_ALL_CORE_FT
To get better and follow your care plan as instructed.  1) Please take Tylenol 650 mg every 4 to 6 hours by mouth for moderate to severe pain control. Please do not exceed over 4,000 mg of Tylenol a day.   2) Please take Omeprazole 40 mg once a day by mouth.

## 2020-08-19 NOTE — CONSULT NOTE ADULT - SUBJECTIVE AND OBJECTIVE BOX
Pain Management Consult Note - [facility] [phone]      Chief Complaint: Abdominal Pain, Left Hip Pain      HPI: patient seen and examined today. Patient with a history of hypothyroidism, HTN, asthma, uterine fibroids, cystitis, genial herpes, depression, OA, trichomonal vaginitis,  and Morbid obesity s/p robotic assisted sleeve. gastrectomy, patient reporting severe abdominal pain and Left Hip Pain. Patient reports severe abdominal pain in her LLQ, radiating to her RLQ, described as a sharp and stabbing as well as persistent Left and R hip pain described as "bone on bone" pain. Patient reports poor po intake and reports persistent nausea throughout the day despite having a scopolamine patch on. As per Istop, patient sees Dr. Terrence Cruz and takes Oxycodone 30mg PO 1-3x a day for severe pain. Patient reports opioid use makes her constipated and takes Milk of Magnesia to have a bm. Patient denies any other secondary side effects from taking Oxycodone PO. Patient denies any illicit substance use. Upon assessment and palpation, abdomen distended and tender to touch. Reviewed pain medication regimen with patient at bedside.         Pain is __x_ sharp ____dull ___burning x___achy ___ Intensity: ____ mild _x__mod _x__severe     Location _x___surgical site ____cervical _____lumbar __x__abd ____upper ext___x_Left lower ext    Worse with _x__activity _x___movement _____physical therapy___ Rest    Improved with __x__medication _x___rest ____physical therapy      ROS: Const:  -___febrile   Eyes:___ENT:___CV: _-__chest pain  Resp: __-__sob  GI:__-_nausea _-__vomiting ___abd pain ___npo ___clears __full diet __bm  :___ Musk: _x__pain ___spasm  Skin:___ Neuro:  _-__bskampbh_-__kceaztzns__-_ numbness _-__weakness _-__paresth  Psych:_x_anxiety  Endo:___ Heme:___Allergy:_________, _x__all others reviewed and negative      PAST MEDICAL & SURGICAL HISTORY:  Alcohol abuse: pt denies  Uterine fibroid  Migraines  Acute cystitis  HTN (hypertension)  Back pain  OA (osteoarthritis)  Genital herpes: pt denies  Asthma  Hypothyroid  Morbid obesity  S/P myomectomy  Surgery, elective: b/l breast reduction x2  S/P hysterectomy: partial  S/P : x3  Surgery, elective: total hip replacement, left      SH: _-__Tobacco   _-__Alcohol                          FH:FAMILY HISTORY:      acetaminophen   Tablet .. 650 milliGRAM(s) Oral every 6 hours  acetaminophen  IVPB .. 1000 milliGRAM(s) IV Intermittent once  ondansetron Injectable 4 milliGRAM(s) IV Push every 6 hours PRN  pantoprazole  Injectable 40 milliGRAM(s) IV Push daily  sodium chloride 0.9%. 1000 milliLiter(s) IV Continuous <Continuous>  valACYclovir 500 milliGRAM(s) Oral two times a day      T(C): 36.7 (20 @ 13:40), Max: 36.9 (20 @ 05:27)  HR: 80 (20 @ 12:23) (71 - 85)  BP: 100/55 (20 @ 12:23) (91/55 - 135/88)  RR: 20 (20 @ 12:23) (13 - 27)  SpO2: 97% (20 @ 12:23) (92% - 100%)  Wt(kg): --    T(C): 36.7 (20 @ 13:40), Max: 36.9 (20 @ 05:27)  HR: 80 (20 @ 12:23) (71 - 85)  BP: 100/55 (-20 @ 12:23) (91/55 - 135/88)  RR: 20 (-20 @ 12:23) (13 - 27)  SpO2: 97% (20 @ 12:23) (92% - 100%)  Wt(kg): --    T(C): 36.7 (08-19-20 @ 13:40), Max: 36.9 (20 @ 05:27)  HR: 80 (20 @ 12:23) (71 - 85)  BP: 100/55 (20 @ 12:23) (91/55 - 135/88)  RR: 20 (20 @ 12:23) (13 - 27)  SpO2: 97% (20 @ 12:23) (92% - 100%)  Wt(kg): --      PHYSICAL EXAM:  Gen Appearance: _x__no acute distress _x__appropriate        Neuro: _x__SILT feet____ EOM Intact Psych: AAOX_3_, _x__mood/affect appropriate        Eyes: _x_conjunctiva WNL  __x___ Pupils equal and round        ENT: _x__ears and nose atraumatic_x__ Hearing grossly intact        Neck: _x__trachea midline, no visible masses ___thyroid without palpable mass    Resp: _x__Nml WOB____No tactile fremitus ___clear to auscultation    Cardio: _x__extremities free from edema __x__pedal pulses palpable    GI/Abdomen: x___soft ___x__ tender to touch_x_____distended_____HSM    Lymphatic: ___no palpable nodes in neck  ___no palpable nodes calves and feet    Skin/Wound: ___Incision, ___Dressing c/d/i,   ____surrounding tissues soft,  ___drain/chest tube present____    Muscular: EHL _5__/5  Gastrocnemius_5__/5    ___absent clubbing/cyanosis          ASSESSMENT: Pt is a 43 y/o F w/ PMH of hypothyroidism, HTN, asthma, uterine fibroids, cystitis, genial herpes, depression, OA, trichomonal vaginitis, and Morbid obesity s/p robotic assisted sleeve. gastrectomy, patient reporting severe abdominal pain and Left Hip Pain.       Recommended Treatment PLAN:  1. Oxycodone 30mg PO Q6h prn severe pain  2. Dilaudid 1mg Q4h prn breakthrough pain  3. Flexeril 5mg PO Q8h prn muscle spasms  4. Scopolamine patch  5. tylenol 975mg Po Q8h   6. x2 lidocaine patches to affected area, 12hours on 12 hours off  Plan discussed with Dr. Ferguson

## 2020-08-19 NOTE — PROGRESS NOTE ADULT - ASSESSMENT
Pt is a 43 y/o F w/ PMH of hypothyroidism, HTN, asthma and Morbid obesity s/p robotic assisted sleeve gastrectomy.     Pain/nausea control  BCLD, IVF  HSQ DVT ppx (holding until 8/19 labs)  CHENCHO Grey, IS  AM labs  Nutritional consult in AM

## 2020-08-19 NOTE — DISCHARGE NOTE PROVIDER - INSTRUCTIONS
Continue bariatric diet as directed by nutritionist Diet: Bariatric Full Fluids. 60 grams protein daily.  64 fluid ounces water daily. Drink small sips throughout the day. Continue diet as outlined by paperwork received as a pre-operative patient.

## 2020-08-19 NOTE — DISCHARGE NOTE PROVIDER - NSDCFUSCHEDAPPT_GEN_ALL_CORE_FT
JANNA MONTOYA ; 09/02/2020 ; [facility] Surg Bariatric [address]  JANNA MONTOYA ; 10/09/2020 ; NPP Endocrin [address] JANNA MONTOYA ; 09/02/2020 ; [facility]PP Surg Bariatric [address]  JANNA MONTOYA ; 10/09/2020 ; NPP Endocrin [address] JANNA MONTOYA ; 09/02/2020 ; NPP Surg Bariatric [address]  JANNA MONTOYA ; 10/09/2020 ; NPP Endocrin [address]

## 2020-08-19 NOTE — DISCHARGE NOTE PROVIDER - NSDCFUADDAPPT_GEN_ALL_CORE_FT
Follow up with  in 1 week. Call the office at  to schedule your appointment. Follow up with Dr. Kowalski in 1 week. Call the office at  to schedule your appointment. (Appointment scheduled for September 2nd, 2020).

## 2020-08-19 NOTE — DISCHARGE NOTE PROVIDER - HOSPITAL COURSE
45 y/o F w/ PMH of hypothyroidism, HTN, asthma and Morbid obesity( BMI 35) s/p robotic assisted sleeve gastrectomy. 43 y/o F w/ PMH of hypothyroidism, HTN, asthma and Morbid obesity( BMI 35) underwent robotic assisted sleeve gastrectomy on 8/21. Pre-op Hgb 13.0, Cr. 0.77. Pt's post op course was complicated by a rapid response on POD1. Pt was non-responsive for 10 seconds and tachypneic to 24. Vital signs were normal and stable at that time. Pt with 97% O2 sat on RA. EKG was normal, CXR was unchanged from pre-op. Lactate was obtained and 2.7. Pt was transferred to Tele. Repeat lactate nl. Pt then reported that she had an unknown diagnosis of COPD. She had been smoking a 1/4 PPD for 30 years. She was started on her home duonebs. [place] opioid registry showed that she obtains 180 pills of oxycodone 30mg per mo from her PCP. Pain management was ordered for management of pain needs. On POD2, pt noted to be tachycardic and SOB. Hgb also noted to be slowly downtrending. CT PE was obtained with no PE. CTAP without an acute bleed, but notable for a small hepatic subcapsular hematoma. On POD3, pt Hgb 6.7 in AM, repeat 6.9, pt was transfused. Post-transfusion Hgb was ____. During her stay, pt tolerated BCLD. At time of discharge, pt deemed stable and ready to return home with plan to follow up as an outpatient. 45 y/o F w/ PMH of hypothyroidism, HTN, asthma and Morbid obesity( BMI 35) underwent robotic assisted sleeve gastrectomy on 8/21. Pre-op Hgb 13.0, Cr. 0.77. Pt's post op course was complicated by a rapid response on POD1. Pt was non-responsive for 10 seconds and tachypneic to 24. Vital signs were normal and stable at that time. Pt with 97% O2 sat on RA. EKG was normal, CXR was unchanged from pre-op. Lactate was obtained and 2.7. Pt was transferred to Tele. Repeat lactate nl. Pt then reported that she had an unknown diagnosis of COPD. She had been smoking a 1/4 PPD for 30 years. She was started on her home duonebs. [place] opioid registry showed that she obtains 180 pills of oxycodone 30mg per mo from her PCP. Pain management was ordered for management of pain needs. On POD2, pt noted to be tachycardic and SOB. Hgb also noted to be slowly downtrending. CT PE was obtained with no PE. CTAP without an acute bleed, but notable for a small hepatic subcapsular hematoma. On POD3, pt Hgb 6.7 in AM, repeat 6.9, pt was transfused. Post-transfusion Hgb was 8.2 and 9.0 on the day of discharge. During her stay, pt tolerated BCLD. At time of discharge, pt deemed stable and ready to return home with plan to follow up as an outpatient with Dr. Kowalski on Sept. 2nd.

## 2020-08-19 NOTE — CHART NOTE - NSCHARTNOTEFT_GEN_A_CORE
Admitting Diagnosis:   Patient is a 44y old  Female who presents with a chief complaint of robotic assisted sleeve gastrectomy (19 Aug 2020 12:34)      Consult: Yes [ X  ]  No [   ]     Reason for Initial Nutrition Assessment: Bariatric Surgery Education      PAST MEDICAL & SURGICAL HISTORY:  Alcohol abuse: pt denies  Uterine fibroid  Migraines  Acute cystitis  HTN (hypertension)  Back pain  OA (osteoarthritis)  Genital herpes: pt denies  Asthma  Hypothyroid  Morbid obesity  S/P myomectomy  Surgery, elective: b/l breast reduction x2  S/P hysterectomy: partial  S/P : x3  Surgery, elective: total hip replacement, left      Current Nutrition Order: BARICLLIQ     PO Intake: Excellent (%) [   ]  Good (50-75%) [   ]  Fair (25-50%) [   ]  Poor (<25%) [   ]  Consuming 1oz/hr and tolerating well, pt states she is "too tired to drink"    GI Issues:   Pt endorses nausea, denies vomiting   Ordered for zofran and protonix    Pain:   epigastric pain  Ordered for tylenol     Skin Integrity:   Surgical incision  Pepe score 20    Labs:       133<L>  |  99  |  8   ----------------------------<  171<H>  4.4   |  22  |  0.65    Ca    9.0      19 Aug 2020 09:42  Phos  2.8       Mg     1.8         TPro  6.6  /  Alb  3.8  /  TBili  0.4  /  DBili  x   /  AST  65<H>  /  ALT  51<H>  /  AlkPhos  39<L>      CAPILLARY BLOOD GLUCOSE      POCT Blood Glucose.: 163 mg/dL (19 Aug 2020 09:21)    Nutritionally Pertinent Lab Values: Na 130,     Medications:  MEDICATIONS  (STANDING):  acetaminophen   Tablet .. 650 milliGRAM(s) Oral every 6 hours  acetaminophen  IVPB .. 1000 milliGRAM(s) IV Intermittent once  albuterol/ipratropium for Nebulization. 3 milliLiter(s) Nebulizer once  pantoprazole  Injectable 40 milliGRAM(s) IV Push daily  sodium chloride 0.9%. 1000 milliLiter(s) (150 mL/Hr) IV Continuous <Continuous>  valACYclovir 500 milliGRAM(s) Oral two times a day    MEDICATIONS  (PRN):  ondansetron Injectable 4 milliGRAM(s) IV Push every 6 hours PRN Nausea      Admitted Anthropometrics:  Height: 67", IBW: 135lbs +/-10%, %IBW: 168%, BMI: 35.8kg/m2      Weight: 228lbs ()      Nutrition Focused Physical Exam: Completed [   ]  Unable to complete [   ]N/A  No remarkable findings    Estimated energy needs:   IBW (61.4kg) used for calculations as pt >120% IBW  Nutrient needs based on [facility] standards of care for maintenance in adults  Needs adjusted for s/p lap sleeve gastrectomy  Weeks 1-2 estimated nutrient needs: 614-737 kcal/day (10-12kcal/kg), 74-92g pro/day (1.2-1.5g/kg), >/=64ox clear fluids  Estimated energy needs for wt maintenance 4950-4534 kcal/day (20-25kcal/kg)     Subjective:   45yo F s/p laparoscopic sleeve gastrectomy POD1. Pt seen in room, resting in bed. Currently on BARICLLIQ diet and tolerating PO. Consuming 1oz/hr w/ good tolerance, pt "too tired to drink" at this time. C/o epigastric pain. Discussed volume sizes of various cups on tray table, suggesting consumption of warmer liquids to help with nausea. Patient prepared with appropriate protein supplements, not prepared with appropriate vitamin supplements. RD provided in-depth education on diet advancement process and specific nutrient needs s/p LSG. Pt receptive and expressed understanding. NKFA or dietary restrictions. Skin: surgical incisions; GI: upper abdominal pain per flowsheet. RD to follow    Nutrition Diagnosis: Food Nutrition related knowledge deficit RT need for educational review on the diet advancement process and specific nutrient needs s/p Sx AEB pt unable to state nutrient needs and/or next phases of diet advancement     Goal: Pt to recall 2 main concepts from education (protein needs, fluid needs, vitamin and mineral needs)     Recommendations:  1. BARICLLIQ diet   2. Recommend advance to phase 1 bariatric full liquid diet when medically feasible   3. Encourage adequate hydration with goal of 4oz/hr and/or 64 oz/day  4. Recommend ice chips and encourage warmer fluids   5. Monitor tolerance as diet advances     Education: Educated on bariatric diet progression, importance of adequate protein, hydration, and compliance with vitamin supplements. Pt expressed positive, verbal understanding; expected compliance is good.     Risk Level: High [ X ] Moderate [   ] Low [   ]    RD to follow up per protocol.  Ana Rosa Valentino RDN Admitting Diagnosis:   Patient is a 44y old  Female who presents with a chief complaint of robotic assisted sleeve gastrectomy (19 Aug 2020 12:34)      Consult: Yes [ X  ]  No [   ]     Reason for Initial Nutrition Assessment: Bariatric Surgery Education      PAST MEDICAL & SURGICAL HISTORY:  Alcohol abuse: pt denies  Uterine fibroid  Migraines  Acute cystitis  HTN (hypertension)  Back pain  OA (osteoarthritis)  Genital herpes: pt denies  Asthma  Hypothyroid  Morbid obesity  S/P myomectomy  Surgery, elective: b/l breast reduction x2  S/P hysterectomy: partial  S/P : x3  Surgery, elective: total hip replacement, left      Current Nutrition Order: BARICLLIQ     PO Intake: Excellent (%) [   ]  Good (50-75%) [   ]  Fair (25-50%) [   ]  Poor (<25%) [   ]  Consuming 1oz/hr and tolerating well, pt states she is "too tired to drink"    GI Issues:   Pt endorses nausea, denies vomiting   Ordered for zofran and protonix    Pain:   epigastric pain  Ordered for tylenol     Skin Integrity:   Surgical incision  Pepe score 20    Labs:       133<L>  |  99  |  8   ----------------------------<  171<H>  4.4   |  22  |  0.65    Ca    9.0      19 Aug 2020 09:42  Phos  2.8       Mg     1.8         TPro  6.6  /  Alb  3.8  /  TBili  0.4  /  DBili  x   /  AST  65<H>  /  ALT  51<H>  /  AlkPhos  39<L>      CAPILLARY BLOOD GLUCOSE      POCT Blood Glucose.: 163 mg/dL (19 Aug 2020 09:21)    Nutritionally Pertinent Lab Values: Na 130,     Medications:  MEDICATIONS  (STANDING):  acetaminophen   Tablet .. 650 milliGRAM(s) Oral every 6 hours  acetaminophen  IVPB .. 1000 milliGRAM(s) IV Intermittent once  albuterol/ipratropium for Nebulization. 3 milliLiter(s) Nebulizer once  pantoprazole  Injectable 40 milliGRAM(s) IV Push daily  sodium chloride 0.9%. 1000 milliLiter(s) (150 mL/Hr) IV Continuous <Continuous>  valACYclovir 500 milliGRAM(s) Oral two times a day    MEDICATIONS  (PRN):  ondansetron Injectable 4 milliGRAM(s) IV Push every 6 hours PRN Nausea      Admitted Anthropometrics:  Height: 67", IBW: 135lbs +/-10%, %IBW: 168%, BMI: 35.8kg/m2      Weight: 228lbs ()      Nutrition Focused Physical Exam: Completed [   ]  Unable to complete [   ]N/A  No remarkable findings    Estimated energy needs:   IBW (61.4kg) used for calculations as pt >120% IBW  Nutrient needs based on [facility] standards of care for maintenance in adults  Needs adjusted for s/p lap sleeve gastrectomy  Weeks 1-2 estimated nutrient needs: 614-737 kcal/day (10-12kcal/kg), 74-92g pro/day (1.2-1.5g/kg), >/=64ox clear fluids  Estimated energy needs for wt maintenance 9246-0851 kcal/day (20-25kcal/kg)     Subjective:   45yo F s/p laparoscopic sleeve gastrectomy POD1. Pt seen in room, resting in bed. Currently on BARICLLIQ diet and tolerating PO. Consuming 1oz/hr w/ good tolerance, pt "too tired to drink" at this time. C/o epigastric pain. Discussed volume sizes of various cups on tray table, suggesting consumption of warmer liquids to help with nausea. Patient prepared with appropriate protein supplements, not prepared with appropriate vitamin supplements, plans to purchase chewable multivitamin, calcium citrate with vitamin D and vitamin B12. RD provided in-depth education on diet advancement process and specific nutrient needs s/p LSG. Pt receptive and expressed understanding. NKFA or dietary restrictions. Skin: surgical incisions; GI: upper abdominal pain per flowsheet. RD to follow    Nutrition Diagnosis: Food Nutrition related knowledge deficit RT need for educational review on the diet advancement process and specific nutrient needs s/p Sx AEB pt unable to state nutrient needs and/or next phases of diet advancement     Goal: Pt to recall 2 main concepts from education (protein needs, fluid needs, vitamin and mineral needs)     Recommendations:  1. BARICLLIQ diet   2. Recommend advance to phase 1 bariatric full liquid diet when medically feasible   3. Encourage adequate hydration with goal of 4oz/hr and/or 64 oz/day  4. Recommend ice chips and encourage warmer fluids   5. Monitor tolerance as diet advances     Education: Educated on bariatric diet progression, importance of adequate protein, hydration, and compliance with vitamin supplements. Pt expressed positive, verbal understanding; expected compliance is good.     Risk Level: High [ X ] Moderate [   ] Low [   ]    RD to follow up per protocol.  Ana Rosa Valentino RDN

## 2020-08-19 NOTE — DISCHARGE NOTE PROVIDER - NSDCMRMEDTOKEN_GEN_ALL_CORE_FT
albuterol 0.63 mg/3 mL (0.021%) inhalation solution:   amLODIPine 10 mg oral tablet: 1 tab(s) orally once a day  ipratropium-albuterol 0.5 mg-2.5 mg/3 mLinhalation solution:   oxyCODONE 30 mg oral tablet:   Synthroid 150 mcg (0.15 mg) oral tablet: 1 tab(s) orally once a day  Tylenol 500 mg oral tablet: albuterol 0.63 mg/3 mL (0.021%) inhalation solution:   amLODIPine 10 mg oral tablet: 1 tab(s) orally once a day  ipratropium-albuterol 0.5 mg-2.5 mg/3 mLinhalation solution:   omeprazole 40 mg oral delayed release capsule: 1 cap(s) orally once a day MDD:1  oxyCODONE 30 mg oral tablet:   Synthroid 150 mcg (0.15 mg) oral tablet: 1 tab(s) orally once a day  Tylenol 325 mg oral tablet: 2 tab(s) orally every 6 hours, As Needed MDD:8

## 2020-08-19 NOTE — CHART NOTE - NSCHARTNOTEFT_GEN_A_CORE
Pt seen and examined at bedside in 8La. Currently c/o epigastric discomfort with mild nausea. Has had some ice chips without retching. Feels fatigued but denies any lightheadedness, chest pain or dyspnea. Vitals currently, systolic , diastolic 55, HR 70s, SpO2 100% in RA.  Currently on mIVF @ 150 with large volume UOP. Repeat lactate normalized however H/H continues to trend down 9.3 from 9.7 from 10.3. Given high UOP will decrease fluids to 70cc/hr. Repeat CBC @ 4pm. Discussed with Dr. Kowalski.

## 2020-08-19 NOTE — PROGRESS NOTE ADULT - SUBJECTIVE AND OBJECTIVE BOX
INTERVAL HPI/OVERNIGHT EVENTS: pt c/o pain, POC performed, pt very sleepy, dilaudid dc'd due to pt difficult to arouse (sternal rub) in PACU, tylenol ONLY, pt had emotional outburst in PACU, passed TOV (650cc), OOBA when arrived on the floor, VSS    STATUS POST:   laparoscopic sleeve gastrectomy    POST OPERATIVE DAY #: 1    SUBJECTIVE:  pt seen at bedside, complaining of nausea and emesis, last episode being this morning. admits to having upper abdominal pain. ambulating in hallways    MEDICATIONS  (STANDING):  acetaminophen   Tablet .. 650 milliGRAM(s) Oral every 6 hours  acetaminophen  IVPB .. 1000 milliGRAM(s) IV Intermittent once  lactated ringers. 1000 milliLiter(s) (150 mL/Hr) IV Continuous <Continuous>  pantoprazole  Injectable 40 milliGRAM(s) IV Push daily    MEDICATIONS  (PRN):  ondansetron Injectable 4 milliGRAM(s) IV Push every 6 hours PRN Nausea      Vital Signs Last 24 Hrs  T(C): 36.9 (19 Aug 2020 05:27), Max: 36.9 (19 Aug 2020 05:27)  T(F): 98.4 (19 Aug 2020 05:27), Max: 98.4 (19 Aug 2020 05:27)  HR: 79 (19 Aug 2020 05:27) (71 - 85)  BP: 122/86 (19 Aug 2020 05:27) (113/61 - 135/88)  BP(mean): 98 (19 Aug 2020 05:27) (81 - 104)  RR: 18 (19 Aug 2020 05:27) (13 - 27)  SpO2: 94% (19 Aug 2020 05:27) (94% - 100%)    PHYSICAL EXAM:      Constitutional: A&Ox3    Respiratory: non labored breathing, no respiratory distress    Cardiovascular: NSR, RRR    Gastrointestinal: soft, nondistended, incisional tenderness, incisions c/d/i    Extremities: (-) edema                  I&O's Detail    18 Aug 2020 07:01  -  19 Aug 2020 07:00  --------------------------------------------------------  IN:    lactated ringers.: 1650 mL    Oral Fluid: 30 mL  Total IN: 1680 mL    OUT:    Voided: 2925 mL  Total OUT: 2925 mL    Total NET: -1245 mL          LABS:                RADIOLOGY & ADDITIONAL STUDIES:

## 2020-08-19 NOTE — DISCHARGE NOTE PROVIDER - NSDCCPCAREPLAN_GEN_ALL_CORE_FT
PRINCIPAL DISCHARGE DIAGNOSIS  Diagnosis: Morbid obesity  Assessment and Plan of Treatment: 45 y/o F w/ PMH of hypothyroidism, HTN, asthma and Morbid obesity({BMI 35) s/p robotic assisted sleeve gastrectomy.   1) Please take Tylenol 650 mg every 4 to 6 hours by mouth for moderate to severe pain control. Please do not exceed over 4,000 mg of Tylenol a day.   2) Please take Omeprazole 40 mg once a day by mouth.

## 2020-08-19 NOTE — DISCHARGE NOTE PROVIDER - CARE PROVIDER_API CALL
Wilver Kowalski J  SURGERY  [address] [place] [address]  Phone: [phone]  Fax: [phone]  Follow Up Time: 1 week

## 2020-08-19 NOTE — DISCHARGE NOTE PROVIDER - NSDCFUADDINST_GEN_ALL_CORE_FT
Follow up with  in 1 week. Call the office at  to schedule your appointment.  You may shower; soap and water over incision sites. Do not scrub. Pat dry when done. No tub bathing or swimming until cleared. Keep incision sites out of the sun as scars will darken. No heavy lifting (>10lbs) or strenuous exercise. Diet: Bariatric Full Fluids. 60 grams protein daily.  64 fluid ounces water daily. Drink small sips throughout the day. Continue diet as outlined by paperwork received as a pre-operative patient. You should be urinating at least 3-4x per day. Call the office if you experience increasing abdominal pain, nausea, vomiting, or temperature >100.4F.  NO ASPIRIN OR NSAIDs until approved by Dr. Kowalski. Avoid alcoholic beverages until cleared by Dr. Kowalski.  1) Please take Tylenol 650 mg every 4 to 6 hours by mouth for moderate to severe pain control. Please do not exceed over 4,000 mg of Tylenol a day.   2) Please take Omeprazole 40 mg once a day by mouth. You may shower; soap and water over incision sites. Do not scrub. Pat dry when done. No tub bathing or swimming until cleared. Keep incision sites out of the sun as scars will darken. No heavy lifting (>10lbs) or strenuous exercise. You should be urinating at least 3-4x per day. Call the office if you experience increasing abdominal pain, nausea, vomiting, or temperature >100.4F.  NO ASPIRIN OR NSAIDs until approved by Dr. Kowalski. Avoid alcoholic beverages until cleared by Dr. Kowalski.  1) Please take Tylenol 650 mg every 4 to 6 hours by mouth for moderate to severe pain control. Please do not exceed over 4,000 mg of Tylenol a day.   2) Please take Omeprazole 40 mg once a day by mouth.

## 2020-08-20 LAB
ANION GAP SERPL CALC-SCNC: 9 MMOL/L — SIGNIFICANT CHANGE UP (ref 5–17)
BUN SERPL-MCNC: 11 MG/DL — SIGNIFICANT CHANGE UP (ref 7–23)
CALCIUM SERPL-MCNC: 8.4 MG/DL — SIGNIFICANT CHANGE UP (ref 8.4–10.5)
CHLORIDE SERPL-SCNC: 100 MMOL/L — SIGNIFICANT CHANGE UP (ref 96–108)
CO2 SERPL-SCNC: 25 MMOL/L — SIGNIFICANT CHANGE UP (ref 22–31)
CREAT SERPL-MCNC: 0.74 MG/DL — SIGNIFICANT CHANGE UP (ref 0.5–1.3)
GLUCOSE SERPL-MCNC: 117 MG/DL — HIGH (ref 70–99)
HCT VFR BLD CALC: 24.9 % — LOW (ref 34.5–45)
HGB BLD-MCNC: 8.3 G/DL — LOW (ref 11.5–15.5)
MAGNESIUM SERPL-MCNC: 2.2 MG/DL — SIGNIFICANT CHANGE UP (ref 1.6–2.6)
MCHC RBC-ENTMCNC: 30.2 PG — SIGNIFICANT CHANGE UP (ref 27–34)
MCHC RBC-ENTMCNC: 33.3 GM/DL — SIGNIFICANT CHANGE UP (ref 32–36)
MCV RBC AUTO: 90.5 FL — SIGNIFICANT CHANGE UP (ref 80–100)
NRBC # BLD: 0 /100 WBCS — SIGNIFICANT CHANGE UP (ref 0–0)
PHOSPHATE SERPL-MCNC: 2.7 MG/DL — SIGNIFICANT CHANGE UP (ref 2.5–4.5)
PLATELET # BLD AUTO: 187 K/UL — SIGNIFICANT CHANGE UP (ref 150–400)
POTASSIUM SERPL-MCNC: 4.4 MMOL/L — SIGNIFICANT CHANGE UP (ref 3.5–5.3)
POTASSIUM SERPL-SCNC: 4.4 MMOL/L — SIGNIFICANT CHANGE UP (ref 3.5–5.3)
RBC # BLD: 2.75 M/UL — LOW (ref 3.8–5.2)
RBC # FLD: 14.2 % — SIGNIFICANT CHANGE UP (ref 10.3–14.5)
SODIUM SERPL-SCNC: 134 MMOL/L — LOW (ref 135–145)
WBC # BLD: 9.02 K/UL — SIGNIFICANT CHANGE UP (ref 3.8–10.5)
WBC # FLD AUTO: 9.02 K/UL — SIGNIFICANT CHANGE UP (ref 3.8–10.5)

## 2020-08-20 PROCEDURE — 71275 CT ANGIOGRAPHY CHEST: CPT | Mod: 26

## 2020-08-20 PROCEDURE — 74178 CT ABD&PLV WO CNTR FLWD CNTR: CPT | Mod: 26

## 2020-08-20 RX ADMIN — Medication 650 MILLIGRAM(S): at 23:08

## 2020-08-20 RX ADMIN — Medication 1000 MILLIGRAM(S): at 09:20

## 2020-08-20 RX ADMIN — Medication 400 MILLIGRAM(S): at 08:52

## 2020-08-20 RX ADMIN — ONDANSETRON 4 MILLIGRAM(S): 8 TABLET, FILM COATED ORAL at 15:50

## 2020-08-20 RX ADMIN — Medication 650 MILLIGRAM(S): at 05:00

## 2020-08-20 RX ADMIN — Medication 3 MILLILITER(S): at 19:45

## 2020-08-20 RX ADMIN — Medication 650 MILLIGRAM(S): at 10:28

## 2020-08-20 RX ADMIN — LIDOCAINE 1 PATCH: 4 CREAM TOPICAL at 19:03

## 2020-08-20 RX ADMIN — OXYCODONE HYDROCHLORIDE 30 MILLIGRAM(S): 5 TABLET ORAL at 10:41

## 2020-08-20 RX ADMIN — OXYCODONE HYDROCHLORIDE 30 MILLIGRAM(S): 5 TABLET ORAL at 11:41

## 2020-08-20 RX ADMIN — VALACYCLOVIR 500 MILLIGRAM(S): 500 TABLET, FILM COATED ORAL at 02:18

## 2020-08-20 RX ADMIN — Medication 150 MICROGRAM(S): at 06:39

## 2020-08-20 RX ADMIN — OXYCODONE HYDROCHLORIDE 30 MILLIGRAM(S): 5 TABLET ORAL at 04:16

## 2020-08-20 RX ADMIN — Medication 650 MILLIGRAM(S): at 04:06

## 2020-08-20 RX ADMIN — Medication 650 MILLIGRAM(S): at 17:30

## 2020-08-20 RX ADMIN — PANTOPRAZOLE SODIUM 40 MILLIGRAM(S): 20 TABLET, DELAYED RELEASE ORAL at 10:28

## 2020-08-20 RX ADMIN — VALACYCLOVIR 500 MILLIGRAM(S): 500 TABLET, FILM COATED ORAL at 18:19

## 2020-08-20 RX ADMIN — OXYCODONE HYDROCHLORIDE 30 MILLIGRAM(S): 5 TABLET ORAL at 16:58

## 2020-08-20 RX ADMIN — SCOPALAMINE 1 PATCH: 1 PATCH, EXTENDED RELEASE TRANSDERMAL at 07:00

## 2020-08-20 RX ADMIN — Medication 650 MILLIGRAM(S): at 11:30

## 2020-08-20 RX ADMIN — OXYCODONE HYDROCHLORIDE 30 MILLIGRAM(S): 5 TABLET ORAL at 05:00

## 2020-08-20 RX ADMIN — Medication 650 MILLIGRAM(S): at 16:57

## 2020-08-20 RX ADMIN — OXYCODONE HYDROCHLORIDE 30 MILLIGRAM(S): 5 TABLET ORAL at 17:41

## 2020-08-20 RX ADMIN — LIDOCAINE 1 PATCH: 4 CREAM TOPICAL at 05:27

## 2020-08-20 RX ADMIN — LIDOCAINE 1 PATCH: 4 CREAM TOPICAL at 16:57

## 2020-08-20 RX ADMIN — SCOPALAMINE 1 PATCH: 1 PATCH, EXTENDED RELEASE TRANSDERMAL at 19:02

## 2020-08-20 NOTE — PROGRESS NOTE ADULT - SUBJECTIVE AND OBJECTIVE BOX
STATUS POST:  LSG     SUBJECTIVE: O/N: 6pm CBC 8.7/25.8, pain controlled, tolerating cld. episode of lightheadedness while ambulating to bathroom, repeat cbc hgb 8.4, 3 sec run of VT ekg wnl BP stable.     This AM, Patient seen and examined bedside by chief resident. She is concerned about her lower BPs and oxygen requirement. Reports that she feels unsteady when walking.     valACYclovir 500 milliGRAM(s) Oral two times a day      Vital Signs Last 24 Hrs  T(C): 36.7 (20 Aug 2020 05:00), Max: 36.9 (19 Aug 2020 17:37)  T(F): 98 (20 Aug 2020 05:00), Max: 98.4 (19 Aug 2020 17:37)  HR: 110 (20 Aug 2020 04:04) (70 - 110)  BP: 115/74 (20 Aug 2020 04:04) (91/55 - 119/64)  BP(mean): 91 (20 Aug 2020 04:04) (67 - 91)  RR: 18 (20 Aug 2020 04:04) (16 - 20)  SpO2: 96% (20 Aug 2020 04:04) (92% - 98%)  I&O's Detail    19 Aug 2020 07:01  -  20 Aug 2020 07:00  --------------------------------------------------------  IN:    sodium chloride 0.9%: 300 mL    sodium chloride 0.9%.: 980 mL  Total IN: 1280 mL    OUT:    Voided: 1350 mL  Total OUT: 1350 mL    Total NET: -70 mL      Physical Exam:  General: No acute distress, resting comfortably in bed  C/V: normal sinus rhythm  Pulm: Nonlabored breathing, no respiratory distress, on RA  Abd: soft, TTP throughout, non-distended, incisions clean/dry/intact.  Extrem: SCDs in place    LABS:                        8.4    9.30  )-----------( 194      ( 19 Aug 2020 22:59 )             25.7     08-19    131<L>  |  99  |  10  ----------------------------<  146<H>  4.6   |  19<L>  |  0.76    Ca    8.7      19 Aug 2020 12:23  Phos  3.1     08-19  Mg     2.6     08-19    TPro  6.6  /  Alb  3.8  /  TBili  0.4  /  DBili  x   /  AST  65<H>  /  ALT  51<H>  /  AlkPhos  39<L>  08-19    PT/INR - ( 19 Aug 2020 09:42 )   PT: 12.9 sec;   INR: 1.08          PTT - ( 19 Aug 2020 09:42 )  PTT:23.5 sec      RADIOLOGY & ADDITIONAL STUDIES:

## 2020-08-20 NOTE — PROGRESS NOTE ADULT - SUBJECTIVE AND OBJECTIVE BOX
Pain Management Progress Note - [facility] [phone]      HPI: patient seen and examined today. Patient with a history of hypothyroidism, HTN, asthma, uterine fibroids, cystitis, genial herpes, depression, OA, trichomonal vaginitis,  and Morbid obesity s/p robotic assisted sleeve. gastrectomy, patient reporting severe abdominal pain and Left Hip Pain. Patient reports severe abdominal pain in her LLQ, radiating to her RLQ, described as a sharp and stabbing as well as persistent Left and R hip pain described as "bone on bone" pain. Patient reports improved pain control with current pain medication regimen. Patient Axox3, denies n,v, no s/s of oversedation. Patient reports eating an entire container of jello, patient continues tp report nausea, scopolamine patch in place.        Pain is __x_ sharp ____dull ___burning x___achy ___ Intensity: ____ mild _x__mod _x__severe     Location _x___surgical site ____cervical _____lumbar __x__abd ____upper ext___x_Left lower ext    Worse with _x__activity _x___movement _____physical therapy___ Rest    Improved with __x__medication _x___rest ____physical therapy      aprepitant  scopolamine 1 mG/72 Hr(s) Patch  enoxaparin Injectable  gabapentin  acetaminophen   Tablet ..  BUpivacaine liposome 1.3% Injectable (no eMAR)  lactated ringers.  acetaminophen   Tablet ..  HYDROmorphone  Injectable  ondansetron Injectable  pantoprazole  Injectable  acetaminophen  IVPB ..  HYDROmorphone  Injectable  acetaminophen  IVPB ..  ondansetron Injectable  metoclopramide Injectable  magnesium sulfate  IVPB  sodium chloride 0.9%.  sodium chloride 0.9%.  HYDROmorphone  Injectable  valACYclovir  albuterol/ipratropium for Nebulization.  valACYclovir  levothyroxine  albuterol/ipratropium for Nebulization  oxyCODONE    IR  lidocaine   Patch      ROS: Const:  -___febrile   Eyes:___ENT:___CV: _-__chest pain  Resp: __-__sob  GI:__-_nausea _-__vomiting ___abd pain ___npo __x_clears __full diet __bm  :___ [place]: _x__pain ___spasm  Skin:___ Neuro:  _-__hbhmzqcd_-__udesncpfo__-_ numbness _-__weakness _-__paresth  Psych:_x_anxiety  Endo:___ Heme:___Allergy:_________, _x__all others reviewed and negative      PAST MEDICAL & SURGICAL HISTORY:  Alcohol abuse: pt denies  Uterine fibroid  Migraines  Acute cystitis  HTN (hypertension)  Back pain  OA (osteoarthritis)  Genital herpes: pt denies  Asthma  Hypothyroid  Morbid obesity  S/P myomectomy  Surgery, elective: b/l breast reduction x2  S/P hysterectomy: partial  S/P : x3  Surgery, elective: total hip replacement, left       @ 07:5797 mL/min/1.73M2      Hemoglobin: 8.3 g/dL ( @ 07:57)  Hemoglobin: 8.4 g/dL ( @ 22:59)  Hemoglobin: 8.7 g/dL ( @ 18:50)  Hemoglobin: 9.3 g/dL ( @ 12:23)  Hemoglobin: 9.7 g/dL ( @ 09:42)  Hemoglobin: 10.3 g/dL ( @ 07:00)        T(C): 36.7 (20 @ 13:54), Max: 36.7 (20 @ 05:00)  HR: 92 (20 @ 16:14) (70 - 110)  BP: 106/54 (20 @ 16:14) (99/52 - 120/57)  RR: 18 (20 @ 16:14) (16 - 18)  SpO2: 94% (20 @ 16:14) (94% - 98%)  Wt(kg): --     PHYSICAL EXAM:  Gen Appearance: _x__no acute distress _x__appropriate        Neuro: _x__SILT feet____ EOM Intact Psych: AAOX_3_, _x__mood/affect appropriate        Eyes: _x_conjunctiva WNL  __x___ Pupils equal and round        ENT: _x__ears and nose atraumatic_x__ Hearing grossly intact        Neck: _x__trachea midline, no visible masses ___thyroid without palpable mass    Resp: _x__Nml WOB____No tactile fremitus ___clear to auscultation    Cardio: _x__extremities free from edema __x__pedal pulses palpable    GI/Abdomen: x___soft ___x__ tender to touch_x_____distended_____HSM    Lymphatic: ___no palpable nodes in neck  ___no palpable nodes calves and feet    Skin/Wound: ___Incision, ___Dressing c/d/i,   ____surrounding tissues soft,  ___drain/chest tube present____    Muscular: EHL _5__/5  Gastrocnemius_5__/5    ___absent clubbing/cyanosis          ASSESSMENT: Pt is a 43 y/o F w/ PMH of hypothyroidism, HTN, asthma, uterine fibroids, cystitis, genial herpes, depression, OA, trichomonal vaginitis, and Morbid obesity s/p robotic assisted sleeve. gastrectomy, patient reporting severe abdominal pain and Left Hip Pain.       Recommended Treatment PLAN:  1. Oxycodone 30mg PO Q6h prn severe pain  2. Dilaudid 1mg Q4h prn breakthrough pain  3. Flexeril 5mg PO Q8h prn muscle spasms  4. Scopolamine patch  5. tylenol 975mg Po Q8h   6. x2 lidocaine patches to affected area, 12hours on 12 hours off  Plan discussed with Dr. Ferguson at bedside

## 2020-08-20 NOTE — PROGRESS NOTE ADULT - ASSESSMENT
Pt is a 43 y/o F w/ PMH of hypothyroidism, HTN, asthma and Morbid obesity s/p robotic assisted sleeve gastrectomy. Hgb slowly downtrending , will need CTAP. Also with icnreased oxygen demand and tachycardic, having held SQH for concern for bleed, will need CTA PE protocol.    CTAP/CTA PE protocol  Pain/nausea control  BCLD, IVF  HSQ DVT ppx (holding until 8/19 labs)  SCDs, OOBA, IS  f/u AM labs

## 2020-08-20 NOTE — CHART NOTE - NSCHARTNOTEFT_GEN_A_CORE
Admitting Diagnosis:   Patient is a 44y old  Female who presents with a chief complaint of robotic assisted sleeve gastrectomy (20 Aug 2020 07:27)      PAST MEDICAL & SURGICAL HISTORY:  Alcohol abuse: pt denies  Uterine fibroid  Migraines  Acute cystitis  HTN (hypertension)  Back pain  OA (osteoarthritis)  Genital herpes: pt denies  Asthma  Hypothyroid  Morbid obesity  S/P myomectomy  Surgery, elective: b/l breast reduction x2  S/P hysterectomy: partial  S/P : x3  Surgery, elective: total hip replacement, left      Current Nutrition Order:   BARICLLIQ     PO Intake: Excellent (%) [   ]  Good (50-75%) [   ]  Fair (25-50%) [   ]  Poor (<25%) [   ]  Reported having ~6oz total this morning. Asked me to pass her tea to sip on during interview.     GI Issues:   Reports mild nausea (although pt had just ambulated from bathroom to bed)  Feeling epigastric pressure after drinking- encouraged pt to sit up when drinking  Ordered for zofran and protonix    Pain:   Pain being medically managed  Ordered for lidocaine    Skin Integrity:   Surgical incision  Pepe score 20      Labs:       134<L>  |  100  |  11  ----------------------------<  117<H>  4.4   |  25  |  0.74    Ca    8.4      20 Aug 2020 07:57  Phos  2.7     08-  Mg     2.2         TPro  6.6  /  Alb  3.8  /  TBili  0.4  /  DBili  x   /  AST  65<H>  /  ALT  51<H>  /  AlkPhos  39<L>  08-    CAPILLARY BLOOD GLUCOSE          Medications:  MEDICATIONS  (STANDING):  acetaminophen   Tablet .. 650 milliGRAM(s) Oral every 6 hours  levothyroxine 150 MICROGram(s) Oral daily  lidocaine   Patch 1 Patch Transdermal every 24 hours  pantoprazole  Injectable 40 milliGRAM(s) IV Push daily  sodium chloride 0.9%. 1000 milliLiter(s) (70 mL/Hr) IV Continuous <Continuous>  valACYclovir 500 milliGRAM(s) Oral two times a day    MEDICATIONS  (PRN):  albuterol/ipratropium for Nebulization 3 milliLiter(s) Nebulizer every 6 hours PRN Shortness of Breath and/or Wheezing  ondansetron Injectable 4 milliGRAM(s) IV Push every 6 hours PRN Nausea  oxyCODONE    IR 30 milliGRAM(s) Oral every 6 hours PRN Severe Pain (7 - 10)      Admitted Anthropometrics:  Height: 67", IBW: 135lbs +/-10%, %IBW: 168%, BMI: 35.8kg/m2      Weight: 228lbs ()    Nutrition Focused Physical Exam: Completed [   ]  Unable to complete [   ]N/A  No remarkable findings    Estimated energy needs:   IBW (61.4kg) used for calculations as pt >120% IBW  Nutrient needs based on [facility] standards of care for maintenance in adults  Needs adjusted for s/p lap sleeve gastrectomy  Weeks 1-2 estimated nutrient needs: 614-737 kcal/day (10-12kcal/kg), 74-92g pro/day (1.2-1.5g/kg), >/=64oz clear fluids  Estimated energy needs for wt maintenance 6298-6321 kcal/day (20-25kcal/kg)     Subjective:   43yo F s/p laparoscopic sleeve gastrectomy POD2. Post-op course c/b downward trend of Hgb, increased oxygen demands and tachycardia. Pt was stepped up to 8LA tele floor yesterday . Had CT PE and CTAP today- results pending.     Pt seen in room, after returning from CT. Was recovering from exertion of walking from bathroom to bed. Remains on a BARICLLIQ diet and tolerating PO. Consumed ~6oz fluid prior to CT scan this am. Reports mild nausea, but finds relief w/ anti-emetics. Observed pt sipping tea w/ good tolerance. Explained importance of tracking hydration and sitting up all the way when drinking. Reviewed vitamins pt still needs to get after LSG. Pt receptive and expressed understanding. Appreciate encouragement of fluid intake as tolerated. RD to follow.     Nutrition Diagnosis:   Food Nutrition related knowledge deficit RT need for educational review on the diet advancement process and specific nutrient needs s/p Sx AEB pt unable to state nutrient needs and/or next phases of diet advancement     Goal:   Pt to recall 2 main concepts from education (protein needs, fluid needs, vitamin and mineral needs)     Recommendations:  1. Consider advancing to Phase 2 Bariatric Full Liquids now that pt is POD2.   2. Encourage adequate hydration with goal of 4oz/hr  3. Encourage warmer fluids as pt seems to be finding them more soothing  4. Diet reinforcement PRN    Education:   Educated on bariatric diet progression, importance of adequate protein, hydration, and compliance with vitamin supplements. Pt expressed positive, verbal understanding; expected compliance is good.     Risk Level: High [ X ] Moderate [   ] Low [   ]

## 2020-08-21 LAB
ANION GAP SERPL CALC-SCNC: 10 MMOL/L — SIGNIFICANT CHANGE UP (ref 5–17)
BLD GP AB SCN SERPL QL: NEGATIVE — SIGNIFICANT CHANGE UP
BUN SERPL-MCNC: 5 MG/DL — LOW (ref 7–23)
CALCIUM SERPL-MCNC: 8.2 MG/DL — LOW (ref 8.4–10.5)
CHLORIDE SERPL-SCNC: 98 MMOL/L — SIGNIFICANT CHANGE UP (ref 96–108)
CO2 SERPL-SCNC: 25 MMOL/L — SIGNIFICANT CHANGE UP (ref 22–31)
CREAT SERPL-MCNC: 0.62 MG/DL — SIGNIFICANT CHANGE UP (ref 0.5–1.3)
GLUCOSE SERPL-MCNC: 89 MG/DL — SIGNIFICANT CHANGE UP (ref 70–99)
HCT VFR BLD CALC: 20.7 % — CRITICAL LOW (ref 34.5–45)
HCT VFR BLD CALC: 20.7 % — CRITICAL LOW (ref 34.5–45)
HCT VFR BLD CALC: 24.2 % — LOW (ref 34.5–45)
HGB BLD-MCNC: 6.7 G/DL — CRITICAL LOW (ref 11.5–15.5)
HGB BLD-MCNC: 6.8 G/DL — CRITICAL LOW (ref 11.5–15.5)
HGB BLD-MCNC: 8.2 G/DL — LOW (ref 11.5–15.5)
MAGNESIUM SERPL-MCNC: 1.9 MG/DL — SIGNIFICANT CHANGE UP (ref 1.6–2.6)
MCHC RBC-ENTMCNC: 30 PG — SIGNIFICANT CHANGE UP (ref 27–34)
MCHC RBC-ENTMCNC: 30.2 PG — SIGNIFICANT CHANGE UP (ref 27–34)
MCHC RBC-ENTMCNC: 31.2 PG — SIGNIFICANT CHANGE UP (ref 27–34)
MCHC RBC-ENTMCNC: 32.4 GM/DL — SIGNIFICANT CHANGE UP (ref 32–36)
MCHC RBC-ENTMCNC: 32.9 GM/DL — SIGNIFICANT CHANGE UP (ref 32–36)
MCHC RBC-ENTMCNC: 33.9 GM/DL — SIGNIFICANT CHANGE UP (ref 32–36)
MCV RBC AUTO: 91.2 FL — SIGNIFICANT CHANGE UP (ref 80–100)
MCV RBC AUTO: 92 FL — SIGNIFICANT CHANGE UP (ref 80–100)
MCV RBC AUTO: 93.2 FL — SIGNIFICANT CHANGE UP (ref 80–100)
NRBC # BLD: 0 /100 WBCS — SIGNIFICANT CHANGE UP (ref 0–0)
PHOSPHATE SERPL-MCNC: 2.5 MG/DL — SIGNIFICANT CHANGE UP (ref 2.5–4.5)
PLATELET # BLD AUTO: 168 K/UL — SIGNIFICANT CHANGE UP (ref 150–400)
PLATELET # BLD AUTO: 168 K/UL — SIGNIFICANT CHANGE UP (ref 150–400)
PLATELET # BLD AUTO: 176 K/UL — SIGNIFICANT CHANGE UP (ref 150–400)
POTASSIUM SERPL-MCNC: 3.6 MMOL/L — SIGNIFICANT CHANGE UP (ref 3.5–5.3)
POTASSIUM SERPL-SCNC: 3.6 MMOL/L — SIGNIFICANT CHANGE UP (ref 3.5–5.3)
RBC # BLD: 2.22 M/UL — LOW (ref 3.8–5.2)
RBC # BLD: 2.27 M/UL — LOW (ref 3.8–5.2)
RBC # BLD: 2.63 M/UL — LOW (ref 3.8–5.2)
RBC # FLD: 14.1 % — SIGNIFICANT CHANGE UP (ref 10.3–14.5)
RBC # FLD: 14.2 % — SIGNIFICANT CHANGE UP (ref 10.3–14.5)
RBC # FLD: 14.4 % — SIGNIFICANT CHANGE UP (ref 10.3–14.5)
RH IG SCN BLD-IMP: POSITIVE — SIGNIFICANT CHANGE UP
SODIUM SERPL-SCNC: 133 MMOL/L — LOW (ref 135–145)
WBC # BLD: 6.58 K/UL — SIGNIFICANT CHANGE UP (ref 3.8–10.5)
WBC # BLD: 6.9 K/UL — SIGNIFICANT CHANGE UP (ref 3.8–10.5)
WBC # BLD: 7.59 K/UL — SIGNIFICANT CHANGE UP (ref 3.8–10.5)
WBC # FLD AUTO: 6.58 K/UL — SIGNIFICANT CHANGE UP (ref 3.8–10.5)
WBC # FLD AUTO: 6.9 K/UL — SIGNIFICANT CHANGE UP (ref 3.8–10.5)
WBC # FLD AUTO: 7.59 K/UL — SIGNIFICANT CHANGE UP (ref 3.8–10.5)

## 2020-08-21 PROCEDURE — 93971 EXTREMITY STUDY: CPT | Mod: 26,RT

## 2020-08-21 RX ORDER — POTASSIUM CHLORIDE 20 MEQ
20 PACKET (EA) ORAL ONCE
Refills: 0 | Status: COMPLETED | OUTPATIENT
Start: 2020-08-21 | End: 2020-08-21

## 2020-08-21 RX ORDER — POTASSIUM PHOSPHATE, MONOBASIC POTASSIUM PHOSPHATE, DIBASIC 236; 224 MG/ML; MG/ML
15 INJECTION, SOLUTION INTRAVENOUS ONCE
Refills: 0 | Status: COMPLETED | OUTPATIENT
Start: 2020-08-21 | End: 2020-08-21

## 2020-08-21 RX ADMIN — Medication 650 MILLIGRAM(S): at 11:56

## 2020-08-21 RX ADMIN — Medication 150 MICROGRAM(S): at 06:30

## 2020-08-21 RX ADMIN — Medication 650 MILLIGRAM(S): at 22:40

## 2020-08-21 RX ADMIN — LIDOCAINE 1 PATCH: 4 CREAM TOPICAL at 04:00

## 2020-08-21 RX ADMIN — VALACYCLOVIR 500 MILLIGRAM(S): 500 TABLET, FILM COATED ORAL at 06:31

## 2020-08-21 RX ADMIN — OXYCODONE HYDROCHLORIDE 30 MILLIGRAM(S): 5 TABLET ORAL at 02:00

## 2020-08-21 RX ADMIN — Medication 650 MILLIGRAM(S): at 00:00

## 2020-08-21 RX ADMIN — OXYCODONE HYDROCHLORIDE 30 MILLIGRAM(S): 5 TABLET ORAL at 00:30

## 2020-08-21 RX ADMIN — Medication 3 MILLILITER(S): at 12:16

## 2020-08-21 RX ADMIN — Medication 650 MILLIGRAM(S): at 17:39

## 2020-08-21 RX ADMIN — LIDOCAINE 1 PATCH: 4 CREAM TOPICAL at 20:44

## 2020-08-21 RX ADMIN — Medication 650 MILLIGRAM(S): at 18:00

## 2020-08-21 RX ADMIN — POTASSIUM PHOSPHATE, MONOBASIC POTASSIUM PHOSPHATE, DIBASIC 63.75 MILLIMOLE(S): 236; 224 INJECTION, SOLUTION INTRAVENOUS at 19:46

## 2020-08-21 RX ADMIN — SCOPALAMINE 1 PATCH: 1 PATCH, EXTENDED RELEASE TRANSDERMAL at 13:00

## 2020-08-21 RX ADMIN — PANTOPRAZOLE SODIUM 40 MILLIGRAM(S): 20 TABLET, DELAYED RELEASE ORAL at 11:57

## 2020-08-21 RX ADMIN — SCOPALAMINE 1 PATCH: 1 PATCH, EXTENDED RELEASE TRANSDERMAL at 06:31

## 2020-08-21 RX ADMIN — LIDOCAINE 1 PATCH: 4 CREAM TOPICAL at 17:40

## 2020-08-21 RX ADMIN — VALACYCLOVIR 500 MILLIGRAM(S): 500 TABLET, FILM COATED ORAL at 17:40

## 2020-08-21 RX ADMIN — Medication 650 MILLIGRAM(S): at 22:35

## 2020-08-21 RX ADMIN — OXYCODONE HYDROCHLORIDE 30 MILLIGRAM(S): 5 TABLET ORAL at 09:02

## 2020-08-21 RX ADMIN — Medication 650 MILLIGRAM(S): at 01:00

## 2020-08-21 RX ADMIN — Medication 650 MILLIGRAM(S): at 08:01

## 2020-08-21 RX ADMIN — OXYCODONE HYDROCHLORIDE 30 MILLIGRAM(S): 5 TABLET ORAL at 22:55

## 2020-08-21 RX ADMIN — OXYCODONE HYDROCHLORIDE 30 MILLIGRAM(S): 5 TABLET ORAL at 22:48

## 2020-08-21 RX ADMIN — Medication 650 MILLIGRAM(S): at 06:31

## 2020-08-21 RX ADMIN — OXYCODONE HYDROCHLORIDE 30 MILLIGRAM(S): 5 TABLET ORAL at 10:00

## 2020-08-21 RX ADMIN — Medication 20 MILLIEQUIVALENT(S): at 11:57

## 2020-08-21 NOTE — PROGRESS NOTE ADULT - ASSESSMENT
Pt is a 45 y/o F w/ PMH of hypothyroidism, HTN, asthma and Morbid obesity s/p robotic assisted sleeve gastrectomy.     Pain/nausea control  BCLD, IVF  HSQ DVT ppx (holding until 8/19 labs)  CHENCHO Grey, IS  AM labs  Nutritional consult in AM Pt is a 43 y/o F w/ PMH of hypothyroidism, HTN, asthma and Morbid obesity s/p robotic assisted sleeve gastrectomy. Pt doing much better today. Tolerating BCLD.    f/u AM labs  Pain/nausea control  BCLD, IVF  SCDs, OOBA, IS  Dispo: home pending labs and clinical status throughout the day

## 2020-08-21 NOTE — PROGRESS NOTE ADULT - SUBJECTIVE AND OBJECTIVE BOX
STATUS POST: POD3 LSG     SUBJECTIVE: O/N: tolerating cld, vss, linwood. Patient seen and examined bedside by chief resident.     valACYclovir 500 milliGRAM(s) Oral two times a day      Vital Signs Last 24 Hrs  T(C): 37.1 (21 Aug 2020 05:05), Max: 37.5 (20 Aug 2020 21:51)  T(F): 98.7 (21 Aug 2020 05:05), Max: 99.5 (20 Aug 2020 21:51)  HR: 100 (21 Aug 2020 04:06) (88 - 102)  BP: 116/58 (21 Aug 2020 04:06) (104/60 - 120/57)  BP(mean): 80 (21 Aug 2020 00:36) (74 - 110)  RR: 18 (21 Aug 2020 04:06) (16 - 18)  SpO2: 92% (21 Aug 2020 04:06) (92% - 98%)  I&O's Detail    19 Aug 2020 07:01  -  20 Aug 2020 07:00  --------------------------------------------------------  IN:    sodium chloride 0.9%: 300 mL    sodium chloride 0.9%.: 980 mL  Total IN: 1280 mL    OUT:    Voided: 1350 mL  Total OUT: 1350 mL    Total NET: -70 mL      20 Aug 2020 07:01  -  21 Aug 2020 06:09  --------------------------------------------------------  IN:    Oral Fluid: 540 mL    sodium chloride 0.9%.: 1330 mL  Total IN: 1870 mL    OUT:    Voided: 1175 mL  Total OUT: 1175 mL    Total NET: 695 mL          Physical Exam:  General: No acute distress, resting comfortably in bed  C/V: normal sinus rhythm  Pulm: Nonlabored breathing, no respiratory distress  Abd: soft, non-tender, non-distended, incisions clean/dry/intact.  Extrem: warm and well perfused, no edema, SCDs in place    LABS:                        8.3    9.02  )-----------( 187      ( 20 Aug 2020 07:57 )             24.9     08-20    134<L>  |  100  |  11  ----------------------------<  117<H>  4.4   |  25  |  0.74    Ca    8.4      20 Aug 2020 07:57  Phos  2.7     08-20  Mg     2.2     08-20    TPro  6.6  /  Alb  3.8  /  TBili  0.4  /  DBili  x   /  AST  65<H>  /  ALT  51<H>  /  AlkPhos  39<L>  08-19    PT/INR - ( 19 Aug 2020 09:42 )   PT: 12.9 sec;   INR: 1.08          PTT - ( 19 Aug 2020 09:42 )  PTT:23.5 sec      RADIOLOGY & ADDITIONAL STUDIES: STATUS POST: POD3 LSG     SUBJECTIVE: O/N: tolerating cld, vss, linwood. Patient seen and examined bedside by chief resident. Pt reports that she is doing well. Tolerating fluids, has improved abdominal pain.    valACYclovir 500 milliGRAM(s) Oral two times a day      Vital Signs Last 24 Hrs  T(C): 37.1 (21 Aug 2020 05:05), Max: 37.5 (20 Aug 2020 21:51)  T(F): 98.7 (21 Aug 2020 05:05), Max: 99.5 (20 Aug 2020 21:51)  HR: 100 (21 Aug 2020 04:06) (88 - 102)  BP: 116/58 (21 Aug 2020 04:06) (104/60 - 120/57)  BP(mean): 80 (21 Aug 2020 00:36) (74 - 110)  RR: 18 (21 Aug 2020 04:06) (16 - 18)  SpO2: 92% (21 Aug 2020 04:06) (92% - 98%)  I&O's Detail    19 Aug 2020 07:01  -  20 Aug 2020 07:00  --------------------------------------------------------  IN:    sodium chloride 0.9%: 300 mL    sodium chloride 0.9%.: 980 mL  Total IN: 1280 mL    OUT:    Voided: 1350 mL  Total OUT: 1350 mL    Total NET: -70 mL      20 Aug 2020 07:01  -  21 Aug 2020 06:09  --------------------------------------------------------  IN:    Oral Fluid: 540 mL    sodium chloride 0.9%.: 1330 mL  Total IN: 1870 mL    OUT:    Voided: 1175 mL  Total OUT: 1175 mL    Total NET: 695 mL          Physical Exam:  General: No acute distress, resting comfortably in bed, appears cheerful  C/V: normal sinus rhythm  Pulm: Nonlabored breathing, no respiratory distress, on RA  Abd: soft, diffusely TTP, non-distended, incisions clean/dry/intact.  Extrem:  SCDs in place    LABS:                        8.3    9.02  )-----------( 187      ( 20 Aug 2020 07:57 )             24.9     08-20    134<L>  |  100  |  11  ----------------------------<  117<H>  4.4   |  25  |  0.74    Ca    8.4      20 Aug 2020 07:57  Phos  2.7     08-20  Mg     2.2     08-20    TPro  6.6  /  Alb  3.8  /  TBili  0.4  /  DBili  x   /  AST  65<H>  /  ALT  51<H>  /  AlkPhos  39<L>  08-19    PT/INR - ( 19 Aug 2020 09:42 )   PT: 12.9 sec;   INR: 1.08          PTT - ( 19 Aug 2020 09:42 )  PTT:23.5 sec      RADIOLOGY & ADDITIONAL STUDIES:

## 2020-08-22 LAB
ANION GAP SERPL CALC-SCNC: 11 MMOL/L — SIGNIFICANT CHANGE UP (ref 5–17)
BUN SERPL-MCNC: 4 MG/DL — LOW (ref 7–23)
CALCIUM SERPL-MCNC: 8.4 MG/DL — SIGNIFICANT CHANGE UP (ref 8.4–10.5)
CHLORIDE SERPL-SCNC: 104 MMOL/L — SIGNIFICANT CHANGE UP (ref 96–108)
CO2 SERPL-SCNC: 24 MMOL/L — SIGNIFICANT CHANGE UP (ref 22–31)
CREAT SERPL-MCNC: 0.55 MG/DL — SIGNIFICANT CHANGE UP (ref 0.5–1.3)
GLUCOSE SERPL-MCNC: 90 MG/DL — SIGNIFICANT CHANGE UP (ref 70–99)
HCT VFR BLD CALC: 24 % — LOW (ref 34.5–45)
HGB BLD-MCNC: 8.1 G/DL — LOW (ref 11.5–15.5)
MAGNESIUM SERPL-MCNC: 2 MG/DL — SIGNIFICANT CHANGE UP (ref 1.6–2.6)
MCHC RBC-ENTMCNC: 30.2 PG — SIGNIFICANT CHANGE UP (ref 27–34)
MCHC RBC-ENTMCNC: 33.8 GM/DL — SIGNIFICANT CHANGE UP (ref 32–36)
MCV RBC AUTO: 89.6 FL — SIGNIFICANT CHANGE UP (ref 80–100)
NRBC # BLD: 0 /100 WBCS — SIGNIFICANT CHANGE UP (ref 0–0)
PHOSPHATE SERPL-MCNC: 2.9 MG/DL — SIGNIFICANT CHANGE UP (ref 2.5–4.5)
PLATELET # BLD AUTO: 187 K/UL — SIGNIFICANT CHANGE UP (ref 150–400)
POTASSIUM SERPL-MCNC: 3.9 MMOL/L — SIGNIFICANT CHANGE UP (ref 3.5–5.3)
POTASSIUM SERPL-SCNC: 3.9 MMOL/L — SIGNIFICANT CHANGE UP (ref 3.5–5.3)
RBC # BLD: 2.68 M/UL — LOW (ref 3.8–5.2)
RBC # FLD: 14.1 % — SIGNIFICANT CHANGE UP (ref 10.3–14.5)
SODIUM SERPL-SCNC: 139 MMOL/L — SIGNIFICANT CHANGE UP (ref 135–145)
WBC # BLD: 5.9 K/UL — SIGNIFICANT CHANGE UP (ref 3.8–10.5)
WBC # FLD AUTO: 5.9 K/UL — SIGNIFICANT CHANGE UP (ref 3.8–10.5)

## 2020-08-22 RX ORDER — POTASSIUM CHLORIDE 20 MEQ
20 PACKET (EA) ORAL ONCE
Refills: 0 | Status: COMPLETED | OUTPATIENT
Start: 2020-08-22 | End: 2020-08-22

## 2020-08-22 RX ADMIN — Medication 20 MILLIEQUIVALENT(S): at 10:02

## 2020-08-22 RX ADMIN — OXYCODONE HYDROCHLORIDE 30 MILLIGRAM(S): 5 TABLET ORAL at 21:38

## 2020-08-22 RX ADMIN — OXYCODONE HYDROCHLORIDE 30 MILLIGRAM(S): 5 TABLET ORAL at 12:33

## 2020-08-22 RX ADMIN — Medication 150 MICROGRAM(S): at 06:17

## 2020-08-22 RX ADMIN — VALACYCLOVIR 500 MILLIGRAM(S): 500 TABLET, FILM COATED ORAL at 07:36

## 2020-08-22 RX ADMIN — LIDOCAINE 1 PATCH: 4 CREAM TOPICAL at 04:30

## 2020-08-22 RX ADMIN — OXYCODONE HYDROCHLORIDE 30 MILLIGRAM(S): 5 TABLET ORAL at 22:01

## 2020-08-22 RX ADMIN — OXYCODONE HYDROCHLORIDE 30 MILLIGRAM(S): 5 TABLET ORAL at 13:00

## 2020-08-22 RX ADMIN — Medication 650 MILLIGRAM(S): at 06:35

## 2020-08-22 RX ADMIN — PANTOPRAZOLE SODIUM 40 MILLIGRAM(S): 20 TABLET, DELAYED RELEASE ORAL at 11:47

## 2020-08-22 RX ADMIN — Medication 650 MILLIGRAM(S): at 06:17

## 2020-08-22 NOTE — PROGRESS NOTE ADULT - ASSESSMENT
Pt is a 45 y/o F w/ PMH of hypothyroidism, HTN, asthma and Morbid obesity POD4 from robotic assisted sleeve gastrectomy. Pt had an appropriate response to transfusion with AM hgb 8.1 from 8.2 last night, stable. She is doing well overall, except that she continues to have exertional SOB. Will attempt for her to be seen by PT and encourage ambulation.    PT/ONAGI Galeano  Pain/nausea control  BCLD, IVF  SCDs, IS  Holding SQH given concern for bleed

## 2020-08-22 NOTE — PROGRESS NOTE ADULT - SUBJECTIVE AND OBJECTIVE BOX
STATUS POST:  POD4 from LSG     SUBJECTIVE: Post-transufision Hgb was 8.2 from 6.9 yesterday. NAEON. Patient seen and examined bedside by chief resident. Patient reporting some SOB with exertion. Tolerating BCLD diet well. Ambulated yesterday. No vomiting.        Vital Signs Last 24 Hrs  T(C): 36.6 (22 Aug 2020 09:34), Max: 36.9 (21 Aug 2020 13:16)  T(F): 97.9 (22 Aug 2020 09:34), Max: 98.4 (21 Aug 2020 13:16)  HR: 84 (22 Aug 2020 03:55) (82 - 100)  BP: 117/55 (22 Aug 2020 03:55) (110/56 - 134/65)  BP(mean): 79 (22 Aug 2020 03:55) (78 - 83)  RR: 18 (22 Aug 2020 03:55) (18 - 18)  SpO2: 95% (22 Aug 2020 03:55) (92% - 96%)  I&O's Detail    21 Aug 2020 07:01  -  22 Aug 2020 07:00  --------------------------------------------------------  IN:    Oral Fluid: 800 mL    sodium chloride 0.9%.: 840 mL    Solution: 250 mL  Total IN: 1890 mL    OUT:    Voided: 1375 mL  Total OUT: 1375 mL    Total NET: 515 mL          Physical Exam:  General: No acute distress, resting comfortably in bed  C/V: normal sinus rhythm  Pulm: labored breathing with accessory muscle usage during exam, no respiratory distress, on RA  Extrem: warm and well perfused, no edema, SCDs in place    LABS:                        8.1    5.90  )-----------( 187      ( 22 Aug 2020 05:55 )             24.0     08-22    139  |  104  |  4<L>  ----------------------------<  90  3.9   |  24  |  0.55    Ca    8.4      22 Aug 2020 05:55  Phos  2.9     08-22  Mg     2.0     08-22            RADIOLOGY & ADDITIONAL STUDIES:

## 2020-08-23 ENCOUNTER — TRANSCRIPTION ENCOUNTER (OUTPATIENT)
Age: 45
End: 2020-08-23

## 2020-08-23 VITALS
TEMPERATURE: 98 F | RESPIRATION RATE: 18 BRPM | HEART RATE: 94 BPM | DIASTOLIC BLOOD PRESSURE: 78 MMHG | OXYGEN SATURATION: 97 % | SYSTOLIC BLOOD PRESSURE: 118 MMHG

## 2020-08-23 LAB
ANION GAP SERPL CALC-SCNC: 12 MMOL/L — SIGNIFICANT CHANGE UP (ref 5–17)
BUN SERPL-MCNC: 5 MG/DL — LOW (ref 7–23)
CALCIUM SERPL-MCNC: 8.9 MG/DL — SIGNIFICANT CHANGE UP (ref 8.4–10.5)
CHLORIDE SERPL-SCNC: 98 MMOL/L — SIGNIFICANT CHANGE UP (ref 96–108)
CO2 SERPL-SCNC: 24 MMOL/L — SIGNIFICANT CHANGE UP (ref 22–31)
CREAT SERPL-MCNC: 0.51 MG/DL — SIGNIFICANT CHANGE UP (ref 0.5–1.3)
GLUCOSE SERPL-MCNC: 107 MG/DL — HIGH (ref 70–99)
HCT VFR BLD CALC: 26.5 % — LOW (ref 34.5–45)
HGB BLD-MCNC: 9 G/DL — LOW (ref 11.5–15.5)
MAGNESIUM SERPL-MCNC: 1.9 MG/DL — SIGNIFICANT CHANGE UP (ref 1.6–2.6)
MCHC RBC-ENTMCNC: 30.7 PG — SIGNIFICANT CHANGE UP (ref 27–34)
MCHC RBC-ENTMCNC: 34 GM/DL — SIGNIFICANT CHANGE UP (ref 32–36)
MCV RBC AUTO: 90.4 FL — SIGNIFICANT CHANGE UP (ref 80–100)
NRBC # BLD: 0 /100 WBCS — SIGNIFICANT CHANGE UP (ref 0–0)
PHOSPHATE SERPL-MCNC: 2.4 MG/DL — LOW (ref 2.5–4.5)
PLATELET # BLD AUTO: 224 K/UL — SIGNIFICANT CHANGE UP (ref 150–400)
POTASSIUM SERPL-MCNC: 3.9 MMOL/L — SIGNIFICANT CHANGE UP (ref 3.5–5.3)
POTASSIUM SERPL-SCNC: 3.9 MMOL/L — SIGNIFICANT CHANGE UP (ref 3.5–5.3)
RBC # BLD: 2.93 M/UL — LOW (ref 3.8–5.2)
RBC # FLD: 14 % — SIGNIFICANT CHANGE UP (ref 10.3–14.5)
SODIUM SERPL-SCNC: 134 MMOL/L — LOW (ref 135–145)
WBC # BLD: 6.98 K/UL — SIGNIFICANT CHANGE UP (ref 3.8–10.5)
WBC # FLD AUTO: 6.98 K/UL — SIGNIFICANT CHANGE UP (ref 3.8–10.5)

## 2020-08-23 PROCEDURE — 88307 TISSUE EXAM BY PATHOLOGIST: CPT

## 2020-08-23 PROCEDURE — 71275 CT ANGIOGRAPHY CHEST: CPT

## 2020-08-23 PROCEDURE — 86923 COMPATIBILITY TEST ELECTRIC: CPT

## 2020-08-23 PROCEDURE — 82962 GLUCOSE BLOOD TEST: CPT

## 2020-08-23 PROCEDURE — 85027 COMPLETE CBC AUTOMATED: CPT

## 2020-08-23 PROCEDURE — 84100 ASSAY OF PHOSPHORUS: CPT

## 2020-08-23 PROCEDURE — 74178 CT ABD&PLV WO CNTR FLWD CNTR: CPT

## 2020-08-23 PROCEDURE — 36430 TRANSFUSION BLD/BLD COMPNT: CPT

## 2020-08-23 PROCEDURE — 86850 RBC ANTIBODY SCREEN: CPT

## 2020-08-23 PROCEDURE — 93005 ELECTROCARDIOGRAM TRACING: CPT

## 2020-08-23 PROCEDURE — 80048 BASIC METABOLIC PNL TOTAL CA: CPT

## 2020-08-23 PROCEDURE — C1889: CPT

## 2020-08-23 PROCEDURE — 36415 COLL VENOUS BLD VENIPUNCTURE: CPT

## 2020-08-23 PROCEDURE — 83735 ASSAY OF MAGNESIUM: CPT

## 2020-08-23 PROCEDURE — 85730 THROMBOPLASTIN TIME PARTIAL: CPT

## 2020-08-23 PROCEDURE — 71045 X-RAY EXAM CHEST 1 VIEW: CPT

## 2020-08-23 PROCEDURE — 94640 AIRWAY INHALATION TREATMENT: CPT

## 2020-08-23 PROCEDURE — S2900: CPT

## 2020-08-23 PROCEDURE — 80053 COMPREHEN METABOLIC PANEL: CPT

## 2020-08-23 PROCEDURE — P9016: CPT

## 2020-08-23 PROCEDURE — 86901 BLOOD TYPING SEROLOGIC RH(D): CPT

## 2020-08-23 PROCEDURE — 85610 PROTHROMBIN TIME: CPT

## 2020-08-23 PROCEDURE — 93971 EXTREMITY STUDY: CPT

## 2020-08-23 PROCEDURE — 83605 ASSAY OF LACTIC ACID: CPT

## 2020-08-23 RX ORDER — ACETAMINOPHEN 500 MG
2 TABLET ORAL
Qty: 112 | Refills: 0
Start: 2020-08-23 | End: 2020-09-05

## 2020-08-23 RX ORDER — VALACYCLOVIR 500 MG/1
1000 TABLET, FILM COATED ORAL ONCE
Refills: 0 | Status: COMPLETED | OUTPATIENT
Start: 2020-08-23 | End: 2020-08-23

## 2020-08-23 RX ORDER — ACETAMINOPHEN 500 MG
0 TABLET ORAL
Qty: 0 | Refills: 0 | DISCHARGE

## 2020-08-23 RX ORDER — OMEPRAZOLE 10 MG/1
1 CAPSULE, DELAYED RELEASE ORAL
Qty: 30 | Refills: 0
Start: 2020-08-23 | End: 2020-09-21

## 2020-08-23 RX ORDER — POTASSIUM CHLORIDE 20 MEQ
10 PACKET (EA) ORAL
Refills: 0 | Status: COMPLETED | OUTPATIENT
Start: 2020-08-23 | End: 2020-08-23

## 2020-08-23 RX ORDER — POTASSIUM CHLORIDE 20 MEQ
20 PACKET (EA) ORAL ONCE
Refills: 0 | Status: COMPLETED | OUTPATIENT
Start: 2020-08-23 | End: 2020-08-23

## 2020-08-23 RX ADMIN — Medication 150 MICROGRAM(S): at 05:57

## 2020-08-23 RX ADMIN — VALACYCLOVIR 1000 MILLIGRAM(S): 500 TABLET, FILM COATED ORAL at 09:09

## 2020-08-23 RX ADMIN — Medication 100 MILLIEQUIVALENT(S): at 11:45

## 2020-08-23 RX ADMIN — PANTOPRAZOLE SODIUM 40 MILLIGRAM(S): 20 TABLET, DELAYED RELEASE ORAL at 11:45

## 2020-08-23 RX ADMIN — Medication 3 MILLILITER(S): at 09:54

## 2020-08-23 RX ADMIN — OXYCODONE HYDROCHLORIDE 30 MILLIGRAM(S): 5 TABLET ORAL at 06:30

## 2020-08-23 RX ADMIN — OXYCODONE HYDROCHLORIDE 30 MILLIGRAM(S): 5 TABLET ORAL at 05:57

## 2020-08-23 RX ADMIN — Medication 650 MILLIGRAM(S): at 06:26

## 2020-08-23 RX ADMIN — Medication 650 MILLIGRAM(S): at 12:04

## 2020-08-23 RX ADMIN — ONDANSETRON 4 MILLIGRAM(S): 8 TABLET, FILM COATED ORAL at 09:09

## 2020-08-23 RX ADMIN — Medication 20 MILLIEQUIVALENT(S): at 11:56

## 2020-08-23 RX ADMIN — Medication 650 MILLIGRAM(S): at 11:45

## 2020-08-23 RX ADMIN — Medication 650 MILLIGRAM(S): at 07:00

## 2020-08-23 NOTE — PROGRESS NOTE ADULT - REASON FOR ADMISSION
robotic assisted sleeve gastrectomy

## 2020-08-23 NOTE — DISCHARGE NOTE NURSING/CASE MANAGEMENT/SOCIAL WORK - PATIENT PORTAL LINK FT
You can access the FollowMyHealth Patient Portal offered by [facility] by registering at the following website: [email or web address]. By joining [email or web address]’s FollowMyHealth portal, you will also be able to view your health information using other applications (apps) compatible with our system.

## 2020-08-23 NOTE — PROGRESS NOTE ADULT - SUBJECTIVE AND OBJECTIVE BOX
POST-OP DAY: 5 s/p sleeve gastrectomy      SUBJECTIVE: Patient seen and examined bedside by chief resident. Patient reports feeling well with minimal nausea and vomiting. She felt SOB yesterday but states that it has resolved after administration of her home dose of albuterol medication. She endorses tolerating her clear liquid diet, able to drink 6 medium cups of liquid today.  Ambulating well. Endorses flatus and BMs. Hemoglobin improved since post-transfusion H/H up to 9.0 today from 8.2.       MEDICATIONS  (PRN):  albuterol/ipratropium for Nebulization 3 milliLiter(s) Nebulizer every 6 hours PRN Shortness of Breath and/or Wheezing  ondansetron Injectable 4 milliGRAM(s) IV Push every 6 hours PRN Nausea  oxyCODONE    IR 30 milliGRAM(s) Oral every 6 hours PRN Severe Pain (7 - 10)    I&O's Detail  Voided: 1950 mL    Vital Signs Last 24 Hrs  T(C): 36.8 (23 Aug 2020 09:43), Max: 37.4 (22 Aug 2020 21:21)  T(F): 98.3 (23 Aug 2020 09:43), Max: 99.3 (22 Aug 2020 21:21)  HR: 94 (23 Aug 2020 09:43) (84 - 96)  BP: 118/78 (23 Aug 2020 09:43) (118/78 - 145/84)  RR: 18 (23 Aug 2020 09:43) (17 - 18)  SpO2: 97% (23 Aug 2020 09:43) (95% - 97%)    General: NAD, resting comfortably in bed  C/V: pulses present and strong in b/l upper extremities  Pulm: Nonlabored breathing, no respiratory distress  Abd: soft, ND, NT   Extrem: WWP, no edema    LABS:               9.0    6.98  )-----------( 224      ( 23 Aug 2020 10:26 )             26.5     08-23    134<L>  |  98  |  5<L>  ----------------------------<  107<H>  3.9   |  24  |  0.51    Ca    8.9      23 Aug 2020 10:26  Phos  2.4     08-23  Mg     1.9     08-23

## 2020-08-23 NOTE — DISCHARGE NOTE NURSING/CASE MANAGEMENT/SOCIAL WORK - NSDCFUADDAPPT_GEN_ALL_CORE_FT
Follow up with Dr. Kowalski in 1 week. Call the office at  to schedule your appointment. (Appointment scheduled for September 2nd, 2020).

## 2020-08-23 NOTE — PROGRESS NOTE ADULT - ASSESSMENT
Pt is a 43 y/o F w/ PMH of hypothyroidism, HTN, asthma and Morbid obesity s/p robotic assisted sleeve gastrectomy.     Pain/nausea control  BCLD  SCDs, OOBA, IS  F/u CBC for eval of H/H   Ambulate  F/u SOB     Plan discussed with attending and chief resident.   ____________________________________________________  Michelle Almazan MD     PGY1 - Surgery Team 1

## 2020-08-24 LAB — SURGICAL PATHOLOGY STUDY: SIGNIFICANT CHANGE UP

## 2020-08-27 DIAGNOSIS — K21.9 GASTRO-ESOPHAGEAL REFLUX DISEASE WITHOUT ESOPHAGITIS: ICD-10-CM

## 2020-08-27 DIAGNOSIS — F32.9 MAJOR DEPRESSIVE DISORDER, SINGLE EPISODE, UNSPECIFIED: ICD-10-CM

## 2020-08-27 DIAGNOSIS — Z90.711 ACQUIRED ABSENCE OF UTERUS WITH REMAINING CERVICAL STUMP: ICD-10-CM

## 2020-08-27 DIAGNOSIS — I10 ESSENTIAL (PRIMARY) HYPERTENSION: ICD-10-CM

## 2020-08-27 DIAGNOSIS — Z96.642 PRESENCE OF LEFT ARTIFICIAL HIP JOINT: ICD-10-CM

## 2020-08-27 DIAGNOSIS — E66.9 OBESITY, UNSPECIFIED: ICD-10-CM

## 2020-08-27 DIAGNOSIS — E03.9 HYPOTHYROIDISM, UNSPECIFIED: ICD-10-CM

## 2020-08-27 DIAGNOSIS — J45.909 UNSPECIFIED ASTHMA, UNCOMPLICATED: ICD-10-CM

## 2020-08-27 DIAGNOSIS — E66.01 MORBID (SEVERE) OBESITY DUE TO EXCESS CALORIES: ICD-10-CM

## 2020-08-27 DIAGNOSIS — K66.0 PERITONEAL ADHESIONS (POSTPROCEDURAL) (POSTINFECTION): ICD-10-CM

## 2020-08-27 DIAGNOSIS — G43.909 MIGRAINE, UNSPECIFIED, NOT INTRACTABLE, WITHOUT STATUS MIGRAINOSUS: ICD-10-CM

## 2020-08-27 DIAGNOSIS — M19.90 UNSPECIFIED OSTEOARTHRITIS, UNSPECIFIED SITE: ICD-10-CM

## 2020-08-27 DIAGNOSIS — Z87.891 PERSONAL HISTORY OF NICOTINE DEPENDENCE: ICD-10-CM

## 2020-09-02 ENCOUNTER — APPOINTMENT (OUTPATIENT)
Dept: BARIATRICS | Facility: CLINIC | Age: 45
End: 2020-09-02
Payer: MEDICAID

## 2020-09-02 VITALS
HEIGHT: 66.5 IN | OXYGEN SATURATION: 98 % | HEART RATE: 98 BPM | DIASTOLIC BLOOD PRESSURE: 69 MMHG | WEIGHT: 210 LBS | TEMPERATURE: 96.9 F | BODY MASS INDEX: 33.35 KG/M2 | SYSTOLIC BLOOD PRESSURE: 100 MMHG

## 2020-09-02 PROBLEM — M19.90 UNSPECIFIED OSTEOARTHRITIS, UNSPECIFIED SITE: Chronic | Status: ACTIVE | Noted: 2020-08-17

## 2020-09-02 PROBLEM — E03.9 HYPOTHYROIDISM, UNSPECIFIED: Chronic | Status: ACTIVE | Noted: 2020-08-17

## 2020-09-02 PROBLEM — I10 ESSENTIAL (PRIMARY) HYPERTENSION: Chronic | Status: ACTIVE | Noted: 2020-08-17

## 2020-09-02 PROBLEM — M54.9 DORSALGIA, UNSPECIFIED: Chronic | Status: ACTIVE | Noted: 2020-08-17

## 2020-09-02 PROBLEM — A60.00 HERPESVIRAL INFECTION OF UROGENITAL SYSTEM, UNSPECIFIED: Chronic | Status: ACTIVE | Noted: 2020-08-17

## 2020-09-02 PROBLEM — F10.10 ALCOHOL ABUSE, UNCOMPLICATED: Chronic | Status: ACTIVE | Noted: 2020-08-17

## 2020-09-02 PROBLEM — N30.00 ACUTE CYSTITIS WITHOUT HEMATURIA: Chronic | Status: ACTIVE | Noted: 2020-08-17

## 2020-09-02 PROBLEM — D25.9 LEIOMYOMA OF UTERUS, UNSPECIFIED: Chronic | Status: ACTIVE | Noted: 2020-08-17

## 2020-09-02 PROBLEM — J45.909 UNSPECIFIED ASTHMA, UNCOMPLICATED: Chronic | Status: ACTIVE | Noted: 2020-08-17

## 2020-09-02 PROBLEM — E66.01 MORBID (SEVERE) OBESITY DUE TO EXCESS CALORIES: Chronic | Status: ACTIVE | Noted: 2020-08-17

## 2020-09-02 PROBLEM — G43.909 MIGRAINE, UNSPECIFIED, NOT INTRACTABLE, WITHOUT STATUS MIGRAINOSUS: Chronic | Status: ACTIVE | Noted: 2020-08-17

## 2020-09-02 PROCEDURE — 99024 POSTOP FOLLOW-UP VISIT: CPT

## 2020-10-01 NOTE — HISTORY OF PRESENT ILLNESS
[Home] : at home, [unfilled] , at the time of the visit. [Medical Office: ([facility])___] : at the medical office located in  [Verbal consent obtained from patient] : the patient, [unfilled] [de-identified] : Patient is doing well and has no complaints. She is getting enough protein shakes, taking her vitamins and starting to exercise.

## 2020-10-07 ENCOUNTER — APPOINTMENT (OUTPATIENT)
Dept: BARIATRICS | Facility: CLINIC | Age: 45
End: 2020-10-07
Payer: MEDICAID

## 2020-10-07 VITALS — WEIGHT: 189 LBS | BODY MASS INDEX: 30.05 KG/M2

## 2020-10-07 PROCEDURE — 99024 POSTOP FOLLOW-UP VISIT: CPT

## 2020-10-07 RX ORDER — OMEPRAZOLE 40 MG/1
40 CAPSULE, DELAYED RELEASE ORAL
Qty: 30 | Refills: 3 | Status: ACTIVE | COMMUNITY
Start: 2020-10-07 | End: 1900-01-01

## 2020-10-07 NOTE — ASSESSMENT
[FreeTextEntry1] : She was told to find a clear protein drink since she is able to tolerate Crystal Light. She was referred to our dieticians for dietary guidance. Her Omeprazole prescription was renewed.

## 2020-10-07 NOTE — REVIEW OF SYSTEMS
[Fever] : no fever [Chills] : no chills [Night Sweats] : no night sweats [Fatigue] : fatigue [Recent Change In Weight] : ~T no recent weight change [Abdominal Pain] : no abdominal pain [Vomiting] : vomiting [Constipation] : no constipation [Diarrhea] : no diarrhea [Reflux/Heartburn] : reflux/heartburn [Hernia] : no hernia

## 2020-10-07 NOTE — HISTORY OF PRESENT ILLNESS
[Home] : at home, [unfilled] , at the time of the visit. [Medical Office: ([facility])___] : at the medical office located in  [Verbal consent obtained from patient] : the patient, [unfilled] [de-identified] : Patient is doing well but complains of GERD and is having trouble tolerating her protein shakes. She says that the shakes are too thick, and they make her nauseated. She states that she ran out of of the original prescription of omeprazole, and she has been suffering from GERD since. She is only getting in 2 eggs in each day. Her weight is 189 pounds.

## 2020-10-09 ENCOUNTER — APPOINTMENT (OUTPATIENT)
Dept: ENDOCRINOLOGY | Facility: CLINIC | Age: 45
End: 2020-10-09

## 2020-10-15 ENCOUNTER — APPOINTMENT (OUTPATIENT)
Dept: BARIATRICS | Facility: CLINIC | Age: 45
End: 2020-10-15
Payer: MEDICAID

## 2020-10-15 PROCEDURE — 97803 MED NUTRITION INDIV SUBSEQ: CPT

## 2020-11-13 ENCOUNTER — APPOINTMENT (OUTPATIENT)
Dept: BARIATRICS | Facility: CLINIC | Age: 45
End: 2020-11-13
Payer: MEDICAID

## 2020-11-13 DIAGNOSIS — E66.01 MORBID (SEVERE) OBESITY DUE TO EXCESS CALORIES: ICD-10-CM

## 2020-11-13 PROCEDURE — 99072 ADDL SUPL MATRL&STAF TM PHE: CPT

## 2020-11-13 PROCEDURE — 97803 MED NUTRITION INDIV SUBSEQ: CPT

## 2020-11-17 PROBLEM — E66.01 MORBID OBESITY DUE TO EXCESS CALORIES: Status: ACTIVE | Noted: 2020-04-21

## 2020-12-03 ENCOUNTER — EMERGENCY (EMERGENCY)
Facility: HOSPITAL | Age: 45
LOS: 1 days | Discharge: HOME | End: 2020-12-03
Attending: EMERGENCY MEDICINE | Admitting: EMERGENCY MEDICINE
Payer: MEDICAID

## 2020-12-03 VITALS
SYSTOLIC BLOOD PRESSURE: 128 MMHG | OXYGEN SATURATION: 98 % | DIASTOLIC BLOOD PRESSURE: 85 MMHG | RESPIRATION RATE: 18 BRPM | HEART RATE: 73 BPM | TEMPERATURE: 98 F

## 2020-12-03 VITALS
WEIGHT: 173.06 LBS | DIASTOLIC BLOOD PRESSURE: 77 MMHG | SYSTOLIC BLOOD PRESSURE: 141 MMHG | HEART RATE: 114 BPM | TEMPERATURE: 98 F | RESPIRATION RATE: 18 BRPM | OXYGEN SATURATION: 97 % | HEIGHT: 67 IN

## 2020-12-03 DIAGNOSIS — Z98.891 HISTORY OF UTERINE SCAR FROM PREVIOUS SURGERY: Chronic | ICD-10-CM

## 2020-12-03 DIAGNOSIS — Z41.9 ENCOUNTER FOR PROCEDURE FOR PURPOSES OTHER THAN REMEDYING HEALTH STATE, UNSPECIFIED: Chronic | ICD-10-CM

## 2020-12-03 DIAGNOSIS — R10.13 EPIGASTRIC PAIN: ICD-10-CM

## 2020-12-03 DIAGNOSIS — R11.2 NAUSEA WITH VOMITING, UNSPECIFIED: ICD-10-CM

## 2020-12-03 DIAGNOSIS — Z98.890 OTHER SPECIFIED POSTPROCEDURAL STATES: Chronic | ICD-10-CM

## 2020-12-03 DIAGNOSIS — Z90.710 ACQUIRED ABSENCE OF BOTH CERVIX AND UTERUS: Chronic | ICD-10-CM

## 2020-12-03 LAB
ALBUMIN SERPL ELPH-MCNC: 3.8 G/DL — SIGNIFICANT CHANGE UP (ref 3.3–5)
ALBUMIN SERPL ELPH-MCNC: 4.1 G/DL — SIGNIFICANT CHANGE UP (ref 3.3–5)
ALP SERPL-CCNC: 55 U/L — SIGNIFICANT CHANGE UP (ref 40–120)
ALP SERPL-CCNC: 56 U/L — SIGNIFICANT CHANGE UP (ref 40–120)
ALT FLD-CCNC: 8 U/L — LOW (ref 10–45)
ALT FLD-CCNC: SIGNIFICANT CHANGE UP (ref 10–45)
ANION GAP SERPL CALC-SCNC: 7 MMOL/L — SIGNIFICANT CHANGE UP (ref 5–17)
ANION GAP SERPL CALC-SCNC: 8 MMOL/L — SIGNIFICANT CHANGE UP (ref 5–17)
APPEARANCE UR: CLEAR — SIGNIFICANT CHANGE UP
AST SERPL-CCNC: 13 U/L — SIGNIFICANT CHANGE UP (ref 10–40)
AST SERPL-CCNC: SIGNIFICANT CHANGE UP (ref 10–40)
BACTERIA # UR AUTO: PRESENT /HPF
BASOPHILS # BLD AUTO: 0.04 K/UL — SIGNIFICANT CHANGE UP (ref 0–0.2)
BASOPHILS NFR BLD AUTO: 1 % — SIGNIFICANT CHANGE UP (ref 0–2)
BILIRUB SERPL-MCNC: 0.3 MG/DL — SIGNIFICANT CHANGE UP (ref 0.2–1.2)
BILIRUB SERPL-MCNC: 0.4 MG/DL — SIGNIFICANT CHANGE UP (ref 0.2–1.2)
BILIRUB UR-MCNC: NEGATIVE — SIGNIFICANT CHANGE UP
BUN SERPL-MCNC: 8 MG/DL — SIGNIFICANT CHANGE UP (ref 7–23)
BUN SERPL-MCNC: 9 MG/DL — SIGNIFICANT CHANGE UP (ref 7–23)
CALCIUM SERPL-MCNC: 8.8 MG/DL — SIGNIFICANT CHANGE UP (ref 8.4–10.5)
CALCIUM SERPL-MCNC: 9.4 MG/DL — SIGNIFICANT CHANGE UP (ref 8.4–10.5)
CHLORIDE SERPL-SCNC: 103 MMOL/L — SIGNIFICANT CHANGE UP (ref 96–108)
CHLORIDE SERPL-SCNC: 104 MMOL/L — SIGNIFICANT CHANGE UP (ref 96–108)
CO2 SERPL-SCNC: 26 MMOL/L — SIGNIFICANT CHANGE UP (ref 22–31)
CO2 SERPL-SCNC: 27 MMOL/L — SIGNIFICANT CHANGE UP (ref 22–31)
COLOR SPEC: YELLOW — SIGNIFICANT CHANGE UP
CREAT SERPL-MCNC: 0.65 MG/DL — SIGNIFICANT CHANGE UP (ref 0.5–1.3)
CREAT SERPL-MCNC: 0.67 MG/DL — SIGNIFICANT CHANGE UP (ref 0.5–1.3)
DIFF PNL FLD: NEGATIVE — SIGNIFICANT CHANGE UP
EOSINOPHIL # BLD AUTO: 0.04 K/UL — SIGNIFICANT CHANGE UP (ref 0–0.5)
EOSINOPHIL NFR BLD AUTO: 1 % — SIGNIFICANT CHANGE UP (ref 0–6)
EPI CELLS # UR: SIGNIFICANT CHANGE UP /HPF (ref 0–5)
GLUCOSE SERPL-MCNC: 89 MG/DL — SIGNIFICANT CHANGE UP (ref 70–99)
GLUCOSE SERPL-MCNC: 94 MG/DL — SIGNIFICANT CHANGE UP (ref 70–99)
GLUCOSE UR QL: NEGATIVE — SIGNIFICANT CHANGE UP
HCT VFR BLD CALC: 38.7 % — SIGNIFICANT CHANGE UP (ref 34.5–45)
HGB BLD-MCNC: 12.9 G/DL — SIGNIFICANT CHANGE UP (ref 11.5–15.5)
IMM GRANULOCYTES NFR BLD AUTO: 0.3 % — SIGNIFICANT CHANGE UP (ref 0–1.5)
KETONES UR-MCNC: NEGATIVE — SIGNIFICANT CHANGE UP
LEUKOCYTE ESTERASE UR-ACNC: NEGATIVE — SIGNIFICANT CHANGE UP
LIDOCAIN IGE QN: 15 U/L — SIGNIFICANT CHANGE UP (ref 7–60)
LYMPHOCYTES # BLD AUTO: 2.1 K/UL — SIGNIFICANT CHANGE UP (ref 1–3.3)
LYMPHOCYTES # BLD AUTO: 54.5 % — HIGH (ref 13–44)
MCHC RBC-ENTMCNC: 29.3 PG — SIGNIFICANT CHANGE UP (ref 27–34)
MCHC RBC-ENTMCNC: 33.3 GM/DL — SIGNIFICANT CHANGE UP (ref 32–36)
MCV RBC AUTO: 88 FL — SIGNIFICANT CHANGE UP (ref 80–100)
MONOCYTES # BLD AUTO: 0.27 K/UL — SIGNIFICANT CHANGE UP (ref 0–0.9)
MONOCYTES NFR BLD AUTO: 7 % — SIGNIFICANT CHANGE UP (ref 2–14)
NEUTROPHILS # BLD AUTO: 1.39 K/UL — LOW (ref 1.8–7.4)
NEUTROPHILS NFR BLD AUTO: 36.2 % — LOW (ref 43–77)
NITRITE UR-MCNC: POSITIVE
NRBC # BLD: 0 /100 WBCS — SIGNIFICANT CHANGE UP (ref 0–0)
PH UR: 7 — SIGNIFICANT CHANGE UP (ref 5–8)
PLATELET # BLD AUTO: 213 K/UL — SIGNIFICANT CHANGE UP (ref 150–400)
POTASSIUM SERPL-MCNC: 3.8 MMOL/L — SIGNIFICANT CHANGE UP (ref 3.5–5.3)
POTASSIUM SERPL-MCNC: SIGNIFICANT CHANGE UP (ref 3.5–5.3)
POTASSIUM SERPL-SCNC: 3.8 MMOL/L — SIGNIFICANT CHANGE UP (ref 3.5–5.3)
POTASSIUM SERPL-SCNC: SIGNIFICANT CHANGE UP (ref 3.5–5.3)
PROT SERPL-MCNC: 6.6 G/DL — SIGNIFICANT CHANGE UP (ref 6–8.3)
PROT SERPL-MCNC: 7.5 G/DL — SIGNIFICANT CHANGE UP (ref 6–8.3)
PROT UR-MCNC: NEGATIVE MG/DL — SIGNIFICANT CHANGE UP
RBC # BLD: 4.4 M/UL — SIGNIFICANT CHANGE UP (ref 3.8–5.2)
RBC # FLD: 14.6 % — HIGH (ref 10.3–14.5)
SODIUM SERPL-SCNC: 137 MMOL/L — SIGNIFICANT CHANGE UP (ref 135–145)
SODIUM SERPL-SCNC: 138 MMOL/L — SIGNIFICANT CHANGE UP (ref 135–145)
SP GR SPEC: 1.02 — SIGNIFICANT CHANGE UP (ref 1–1.03)
UROBILINOGEN FLD QL: 1 E.U./DL — SIGNIFICANT CHANGE UP
WBC # BLD: 3.85 K/UL — SIGNIFICANT CHANGE UP (ref 3.8–10.5)
WBC # FLD AUTO: 3.85 K/UL — SIGNIFICANT CHANGE UP (ref 3.8–10.5)
WBC UR QL: < 5 /HPF — SIGNIFICANT CHANGE UP

## 2020-12-03 PROCEDURE — 99284 EMERGENCY DEPT VISIT MOD MDM: CPT

## 2020-12-03 PROCEDURE — 96361 HYDRATE IV INFUSION ADD-ON: CPT

## 2020-12-03 PROCEDURE — 99284 EMERGENCY DEPT VISIT MOD MDM: CPT | Mod: 25

## 2020-12-03 PROCEDURE — 99282 EMERGENCY DEPT VISIT SF MDM: CPT

## 2020-12-03 PROCEDURE — 85025 COMPLETE CBC W/AUTO DIFF WBC: CPT

## 2020-12-03 PROCEDURE — 81001 URINALYSIS AUTO W/SCOPE: CPT

## 2020-12-03 PROCEDURE — 80053 COMPREHEN METABOLIC PANEL: CPT

## 2020-12-03 PROCEDURE — 96374 THER/PROPH/DIAG INJ IV PUSH: CPT

## 2020-12-03 PROCEDURE — 83690 ASSAY OF LIPASE: CPT

## 2020-12-03 PROCEDURE — 36415 COLL VENOUS BLD VENIPUNCTURE: CPT

## 2020-12-03 RX ORDER — ONDANSETRON 8 MG/1
4 TABLET, FILM COATED ORAL ONCE
Refills: 0 | Status: COMPLETED | OUTPATIENT
Start: 2020-12-03 | End: 2020-12-03

## 2020-12-03 RX ORDER — ONDANSETRON 8 MG/1
1 TABLET, FILM COATED ORAL
Qty: 21 | Refills: 0
Start: 2020-12-03 | End: 2020-12-09

## 2020-12-03 RX ORDER — SODIUM CHLORIDE 9 MG/ML
1000 INJECTION INTRAMUSCULAR; INTRAVENOUS; SUBCUTANEOUS ONCE
Refills: 0 | Status: COMPLETED | OUTPATIENT
Start: 2020-12-03 | End: 2020-12-03

## 2020-12-03 RX ADMIN — ONDANSETRON 4 MILLIGRAM(S): 8 TABLET, FILM COATED ORAL at 21:02

## 2020-12-03 RX ADMIN — SODIUM CHLORIDE 1000 MILLILITER(S): 9 INJECTION INTRAMUSCULAR; INTRAVENOUS; SUBCUTANEOUS at 18:54

## 2020-12-03 RX ADMIN — ONDANSETRON 4 MILLIGRAM(S): 8 TABLET, FILM COATED ORAL at 18:54

## 2020-12-03 RX ADMIN — SODIUM CHLORIDE 1000 MILLILITER(S): 9 INJECTION INTRAMUSCULAR; INTRAVENOUS; SUBCUTANEOUS at 21:10

## 2020-12-03 NOTE — ED PROVIDER NOTE - PATIENT PORTAL LINK FT
You can access the FollowMyHealth Patient Portal offered by [facility] by registering at the following website: [email or web address]. By joining [facility]’s FollowMyHealth portal, you will also be able to view your health information using other applications (apps) compatible with our system.

## 2020-12-03 NOTE — CONSULT NOTE ADULT - SUBJECTIVE AND OBJECTIVE BOX
HPI: This is a 46 yo F PMH hypothyroidism, HTN, asthma, PSH  x3, myomectomy in , hystorectomy in , breast reduction x 2, MO now s/p robotic assisted laparoscopic sleeve gastrectomy in August of this year. Post operative course was complicated by rapid response called for unresponsiveness, tachycardia, anemia requiring blood transfusion. W/u was negative for PE and pt was discharged bernarda on POD5. Pt states that since the surgery she has been unable to tolerate PO intake well. She began drinking the protein shakes, but subsequently became unable to tolerate without emesis. She has since been taking in only water and Crystal Light. She presents to the ED today for further evaluation.    Seen and examined in ED. She endorses the above. Denies any fevers, chills, cp, sob, diarrhea, constipation. No sick contacts, no recent travel history.       PAST MEDICAL & SURGICAL HISTORY:  Alcohol abuse  pt denies    Uterine fibroid    Migraines    Acute cystitis    HTN (hypertension)    Back pain    OA (osteoarthritis)    Genital herpes  pt denies    Asthma    Hypothyroid    Morbid obesity    S/P myomectomy    Surgery, elective  b/l breast reduction x2    S/P hysterectomy  partial    S/P   x3    Surgery, elective  total hip replacement, left        MEDICATIONS  (STANDING):  ondansetron   Disintegrating Tablet 4 milliGRAM(s) Oral Once    MEDICATIONS  (PRN):      Allergies    No Known Allergies    Intolerances        SOCIAL HISTORY:    FAMILY HISTORY:      Review of Systems    Vital Signs Last 24 Hrs  T(C): 36.4 (03 Dec 2020 17:48), Max: 36.4 (03 Dec 2020 17:48)  T(F): 97.6 (03 Dec 2020 17:48), Max: 97.6 (03 Dec 2020 17:48)  HR: 114 (03 Dec 2020 17:48) (114 - 114)  BP: 141/77 (03 Dec 2020 17:48) (141/77 - 141/77)  BP(mean): --  RR: 18 (03 Dec 2020 17:48) (18 - 18)  SpO2: 97% (03 Dec 2020 17:48) (97% - 97%)    I&O's Summary      Physical Exam:  General: NAD, resting comfortably  HEENT: NC/AT, EOMI, normal hearing, no oral lesions, no LAD, neck supple  Pulmonary: normal resp effort, CTA-B  Cardiovascular: NSR, no murmurs  Abdominal: soft, mildly tender to palpation diffusely, non-distended. Well-healed surgical incisions.   Extremities: WWP, normal strength, no clubbing/cyanosis/edema  Neuro: A/O x 3, CNs II-XII grossly intact, normal sensation, no focal deficits  Pulses: palpable distal pulses    Lines/drains/tubes:    LABS:                        12.9   3.85  )-----------( 213      ( 03 Dec 2020 18:59 )             38.7         137  |  104  |  8   ----------------------------<  89  3.8   |  26  |  0.67    Ca    8.8      03 Dec 2020 19:57    TPro  6.6  /  Alb  3.8  /  TBili  0.3  /  DBili  x   /  AST  13  /  ALT  8<L>  /  AlkPhos  55  12-03      Urinalysis Basic - ( 03 Dec 2020 19:05 )    Color: Yellow / Appearance: Clear / S.020 / pH: x  Gluc: x / Ketone: NEGATIVE  / Bili: Negative / Urobili: 1.0 E.U./dL   Blood: x / Protein: NEGATIVE mg/dL / Nitrite: POSITIVE   Leuk Esterase: NEGATIVE / RBC: x / WBC < 5 /HPF   Sq Epi: x / Non Sq Epi: 0-5 /HPF / Bacteria: Present /HPF      CAPILLARY BLOOD GLUCOSE        LIVER FUNCTIONS - ( 03 Dec 2020 19:57 )  Alb: 3.8 g/dL / Pro: 6.6 g/dL / ALK PHOS: 55 U/L / ALT: 8 U/L / AST: 13 U/L / GGT: x             Cultures:      RADIOLOGY & ADDITIONAL STUDIES:

## 2020-12-03 NOTE — CONSULT NOTE ADULT - ASSESSMENT
44 yo F PMH hypothyroidism, HTN, asthma, PSH  x3, myomectomy in , hystorectomy in , breast reduction x 2, MO now s/p robotic assisted laparoscopic sleeve gastrectomy in August of this year. Post operative course was complicated by rapid response called for unresponsiveness, tachycardia, anemia requiring blood transfusion. She presents with PO intolerance    Recommendations:  Rehydration  PO challenge--if tolerates okay for discharge  F/u with Dr. Kowalski in office next week  Rest of disposition per ED  Plan discussed with chief resident and Dr. Kowalski

## 2020-12-03 NOTE — ED ADULT NURSE NOTE - NSIMPLEMENTINTERV_GEN_ALL_ED
Implemented All Universal Safety Interventions:  [place] to call system. Call bell, personal items and telephone within reach. Instruct patient to call for assistance. Room bathroom lighting operational. Non-slip footwear when patient is off stretcher. Physically safe environment: no spills, clutter or unnecessary equipment. Stretcher in lowest position, wheels locked, appropriate side rails in place.

## 2020-12-03 NOTE — ED ADULT NURSE NOTE - CHPI ED NUR SYMPTOMS NEG
no abdominal distension/no diarrhea/no blood in stool/no chills/no fever/no hematuria/no dysuria/no burning urination

## 2020-12-03 NOTE — ED PROVIDER NOTE - CARE PROVIDER_API CALL
Wilver Kowalski J  SURGERY  [address] [place] [address]  Phone: [phone]  Fax: [phone]  Follow Up Time:

## 2020-12-03 NOTE — ED ADULT NURSE NOTE - CHIEF COMPLAINT QUOTE
PT presents to ED w/ c/o N/V, unable to keep things down s/p gastric sleeve done by MD Kowalski on 8/18/20202. Pt states dehydrated, dark urine, feeling weak. Denies fever/chills.

## 2020-12-03 NOTE — CONSULT NOTE ADULT - ATTENDING COMMENTS
Agree with  above. PO intolerance exacerbated by dehydration. Rehydrate with IV fluids. Administer antiemetics and D/C home if successful PO challenge.

## 2020-12-03 NOTE — ED PROVIDER NOTE - OBJECTIVE STATEMENT
HPI: This is a 46 yo F PMH hypothyroidism, HTN, asthma, PSH  x3, myomectomy in , hysterectomy in , breast reduction x 2, MO now s/p robotic assisted laparoscopic sleeve gastrectomy in August of this year, in the ER c/o persistent nausea and vomiting since er discharge after surgery in August. Pt reports she is happy about weight loss, but she is concerned that her PO intake is very minimal , concerned about persistent nausea and vomiting.

## 2020-12-03 NOTE — ED PROVIDER NOTE - NSFOLLOWUPINSTRUCTIONS_ED_ALL_ED_FT
ACUTE NAUSEA AND VOMITING - [facility] Instructions(ER/ED)           Acute Nausea and Vomiting    WHAT YOU NEED TO KNOW:    Acute nausea and vomiting start suddenly, worsen quickly, and last a short time.    DISCHARGE INSTRUCTIONS:    Return to the emergency department if:   •You see blood in your vomit or your bowel movements.      •You have sudden, severe pain in your chest and upper abdomen after hard vomiting or retching.      •You have swelling in your neck and chest.       •You are dizzy, cold, and thirsty and your eyes and mouth are dry.      •You are urinating very little or not at all.      •You have muscle weakness, leg cramps, and trouble breathing.       •Your heart is beating much faster than normal.       •You continue to vomit for more than 48 hours.       Contact your healthcare provider if:   •You have frequent dry heaves (vomiting but nothing comes out).      •Your nausea and vomiting does not get better or go away after you use medicine.      •You have questions or concerns about your condition or treatment.      Medicines: You may need any of the following:   •Medicines may be given to calm your stomach and stop your vomiting. You may also need medicines to help you feel more relaxed or to stop nausea and vomiting caused by motion sickness.      •Gastrointestinal stimulants are used to help empty your stomach and bowels. This may help decrease nausea and vomiting.      •Take your medicine as directed. Contact your healthcare provider if you think your medicine is not helping or if you have side effects. Tell him or her if you are allergic to any medicine. Keep a list of the medicines, vitamins, and herbs you take. Include the amounts, and when and why you take them. Bring the list or the pill bottles to follow-up visits. Carry your medicine list with you in case of an emergency.      Prevent or manage acute nausea and vomiting:   •Do not drink alcohol. Alcohol may upset or irritate your stomach. Too much alcohol can also cause acute nausea and vomiting.      •Control stress. Headaches due to stress may cause nausea and vomiting. Find ways to relax and manage your stress. Get more rest and sleep.      •Drink more liquids as directed. Vomiting can lead to dehydration. It is important to drink more liquids to help replace lost body fluids. Ask your healthcare provider how much liquid to drink each day and which liquids are best for you. Your provider may recommend that you drink an oral rehydration solution (ORS). ORS contains water, salts, and sugar that are needed to replace the lost body fluids. Ask what kind of ORS to use, how much to drink, and where to get it.      •Eat smaller meals, more often. Eat small amounts of food every 2 to 3 hours, even if you are not hungry. Food in your stomach may decrease your nausea.      •Talk to your healthcare provider before you take over-the-counter (OTC) medicines. These medicines can cause serious problems if you use certain other medicines, or you have a medical condition. You may have problems if you use too much or use them for longer than the label says. Follow directions on the label carefully.       Follow up with your healthcare provider as directed: Write down your questions so you remember to ask them during your follow-up visits.

## 2020-12-03 NOTE — ED PROVIDER NOTE - ATTENDING CONTRIBUTION TO CARE
45F PMH hypothyroidism, HTN, asthma, PSH  x3, myomectomy in , hysterectomy , breast reduction x 2, [place] now s/p robotic assisted laparoscopic sleeve gastrectomy 2020 c/b  unresponsiveness/tachycardia/anemia requiring blood transfusion, now p/w decreased PO. Pt states that every times she eats she gets nauseated or vomits. No abd pain, fevers or other systemic symptoms. Mild tachycardia, other vitals wnl. Exam as above. Very well appearing.  In ED: Given IVF and anti-emetics. labs grossly wnl. Feeling much better. Evaluated by surgery, recommending outpt f/u. Pt tolerating PO, HR improved. Clinically no indication for further emergent ED workup or hospitalization at this time. Comfortable for dc, outpt f/u.   UA sent prior to my eval, nitrite + but has no urinary complaints, no indication to treat at this time.

## 2020-12-03 NOTE — ED ADULT NURSE NOTE - OBJECTIVE STATEMENT
Pt presents to ED c/o dehydration. Pt had gastric sleeve procedure in August. reports since the procedure, has been experiencing continuous nausea and vomiting every time she tries to eat or drink anything. For last few days has only been able to take small sips of water daily. Pt reports feeling very weak. Noticed last few days urine has been very dark.

## 2020-12-03 NOTE — ED ADULT NURSE NOTE - PMH
Acute cystitis    Alcohol abuse  pt denies  Asthma    Back pain    Genital herpes  pt denies  HTN (hypertension)    Hypothyroid    Migraines    Morbid obesity    OA (osteoarthritis)    Uterine fibroid

## 2020-12-03 NOTE — ED PROVIDER NOTE - CLINICAL SUMMARY MEDICAL DECISION MAKING FREE TEXT BOX
44 yo female with h/o gastric sleeve 08/2020 done by , in the ER c/o [persistent nausea and vomiting, difficulty to tolerate PO. Pt states symptoms are chronic, started after her surgery. Pt denies abdominal pain, diarrhea, dysuria, hematuria, or urinary frequency. Pt also runs out of her anti-nausea medications. Pt well appearing, VSS, abdomen -benign, labs done and she heceived IVF and IV Zofran. Tolerate PO well and seeking discharge home now.   Pt evaluated by surgical resident on call and case discussed with . No indication for any imaging study tonight, pt is stable for d/c.  Rx for Zofran sent.

## 2020-12-09 ENCOUNTER — APPOINTMENT (OUTPATIENT)
Dept: BARIATRICS | Facility: CLINIC | Age: 45
End: 2020-12-09

## 2020-12-17 ENCOUNTER — APPOINTMENT (OUTPATIENT)
Dept: BARIATRICS | Facility: CLINIC | Age: 45
End: 2020-12-17
Payer: MEDICAID

## 2020-12-17 VITALS — BODY MASS INDEX: 27.03 KG/M2 | WEIGHT: 170 LBS

## 2020-12-17 DIAGNOSIS — E55.9 VITAMIN D DEFICIENCY, UNSPECIFIED: ICD-10-CM

## 2020-12-17 PROCEDURE — 99442: CPT

## 2020-12-17 RX ORDER — ERGOCALCIFEROL 1.25 MG/1
1.25 MG CAPSULE, LIQUID FILLED ORAL
Qty: 4 | Refills: 2 | Status: ACTIVE | COMMUNITY
Start: 2020-12-17 | End: 1900-01-01

## 2020-12-18 LAB
25(OH)D3 SERPL-MCNC: 15.7 NG/ML
ALBUMIN SERPL ELPH-MCNC: 4.1 G/DL
ALP BLD-CCNC: 62 U/L
ALT SERPL-CCNC: 8 U/L
ANION GAP SERPL CALC-SCNC: 11 MMOL/L
AST SERPL-CCNC: 13 U/L
BASOPHILS # BLD AUTO: 0.04 K/UL
BASOPHILS NFR BLD AUTO: 1.4 %
BILIRUB SERPL-MCNC: 0.5 MG/DL
BUN SERPL-MCNC: 6 MG/DL
CA-I SERPL-SCNC: 1.22 MMOL/L
CALCIUM SERPL-MCNC: 9.5 MG/DL
CALCIUM SERPL-MCNC: 9.5 MG/DL
CHLORIDE SERPL-SCNC: 102 MMOL/L
CHOLEST SERPL-MCNC: 199 MG/DL
CO2 SERPL-SCNC: 26 MMOL/L
CREAT SERPL-MCNC: 0.65 MG/DL
EOSINOPHIL # BLD AUTO: 0.03 K/UL
EOSINOPHIL NFR BLD AUTO: 1.1 %
ESTIMATED AVERAGE GLUCOSE: 117 MG/DL
FOLATE SERPL-MCNC: 7.6 NG/ML
GLUCOSE SERPL-MCNC: 92 MG/DL
HBA1C MFR BLD HPLC: 5.7 %
HCT VFR BLD CALC: 38.1 %
HDLC SERPL-MCNC: 52 MG/DL
HGB BLD-MCNC: 12.7 G/DL
IMM GRANULOCYTES NFR BLD AUTO: 0.4 %
IRON SATN MFR SERPL: 36 %
IRON SERPL-MCNC: 88 UG/DL
LDLC SERPL CALC-MCNC: 126 MG/DL
LYMPHOCYTES # BLD AUTO: 1.46 K/UL
LYMPHOCYTES NFR BLD AUTO: 52 %
MAN DIFF?: NORMAL
MCHC RBC-ENTMCNC: 29.8 PG
MCHC RBC-ENTMCNC: 33.3 GM/DL
MCV RBC AUTO: 89.4 FL
MONOCYTES # BLD AUTO: 0.25 K/UL
MONOCYTES NFR BLD AUTO: 8.9 %
NEUTROPHILS # BLD AUTO: 1.02 K/UL
NEUTROPHILS NFR BLD AUTO: 36.2 %
NONHDLC SERPL-MCNC: 147 MG/DL
PARATHYROID HORMONE INTACT: 60 PG/ML
PLATELET # BLD AUTO: 202 K/UL
POTASSIUM SERPL-SCNC: 4 MMOL/L
PREALB SERPL NEPH-MCNC: 24 MG/DL
PROT SERPL-MCNC: 6.7 G/DL
RBC # BLD: 4.26 M/UL
RBC # FLD: 15.2 %
SODIUM SERPL-SCNC: 139 MMOL/L
TIBC SERPL-MCNC: 245 UG/DL
TRIGL SERPL-MCNC: 104 MG/DL
TSH SERPL-ACNC: 4.59 UIU/ML
UIBC SERPL-MCNC: 158 UG/DL
VIT B12 SERPL-MCNC: 452 PG/ML
WBC # FLD AUTO: 2.81 K/UL

## 2020-12-21 LAB
A-TOCOPHEROL VIT E SERPL-MCNC: 8.9 MG/L
BETA+GAMMA TOCOPHEROL SERPL-MCNC: 1.2 MG/L
VIT A SERPL-MCNC: 38.2 UG/DL
VIT B1 SERPL-MCNC: 72.3 NMOL/L
ZINC SERPL-MCNC: 117 UG/DL

## 2020-12-28 ENCOUNTER — APPOINTMENT (OUTPATIENT)
Dept: BARIATRICS | Facility: CLINIC | Age: 45
End: 2020-12-28
Payer: MEDICAID

## 2020-12-28 VITALS — WEIGHT: 165 LBS | BODY MASS INDEX: 26.23 KG/M2

## 2020-12-28 DIAGNOSIS — K21.9 GASTRO-ESOPHAGEAL REFLUX DISEASE W/OUT ESOPHAGITIS: ICD-10-CM

## 2020-12-28 DIAGNOSIS — I10 ESSENTIAL (PRIMARY) HYPERTENSION: ICD-10-CM

## 2020-12-28 PROCEDURE — 99072 ADDL SUPL MATRL&STAF TM PHE: CPT

## 2020-12-28 PROCEDURE — 97803 MED NUTRITION INDIV SUBSEQ: CPT

## 2021-02-21 NOTE — ASSESSMENT
[FreeTextEntry1] : She was encouraged to start a regular exercise regimen focused on strength training.

## 2021-02-21 NOTE — HISTORY OF PRESENT ILLNESS
[Home] : at home, [unfilled] , at the time of the visit. [Other Location: e.g. Home (Enter Location, City,State)___] : at [unfilled] [Verbal consent obtained from patient] : the patient, [unfilled] [de-identified] : Patient is doing well and has no complaints. She is eating a proper diet and taking her vitamins, She is only walking for exercise.

## 2021-09-20 ENCOUNTER — APPOINTMENT (OUTPATIENT)
Dept: RADIOLOGY | Facility: HOSPITAL | Age: 46
End: 2021-09-20
Payer: MEDICAID

## 2021-09-20 ENCOUNTER — OUTPATIENT (OUTPATIENT)
Dept: OUTPATIENT SERVICES | Facility: HOSPITAL | Age: 46
LOS: 1 days | End: 2021-09-20
Payer: MEDICAID

## 2021-09-20 DIAGNOSIS — Z98.891 HISTORY OF UTERINE SCAR FROM PREVIOUS SURGERY: Chronic | ICD-10-CM

## 2021-09-20 DIAGNOSIS — Z90.710 ACQUIRED ABSENCE OF BOTH CERVIX AND UTERUS: Chronic | ICD-10-CM

## 2021-09-20 DIAGNOSIS — Z98.890 OTHER SPECIFIED POSTPROCEDURAL STATES: Chronic | ICD-10-CM

## 2021-09-20 DIAGNOSIS — Z41.9 ENCOUNTER FOR PROCEDURE FOR PURPOSES OTHER THAN REMEDYING HEALTH STATE, UNSPECIFIED: Chronic | ICD-10-CM

## 2021-09-20 DIAGNOSIS — Z01.812 ENCOUNTER FOR PREPROCEDURAL LABORATORY EXAMINATION: ICD-10-CM

## 2021-09-20 PROCEDURE — 74240 X-RAY XM UPR GI TRC 1CNTRST: CPT

## 2021-09-20 PROCEDURE — 74240 X-RAY XM UPR GI TRC 1CNTRST: CPT | Mod: 26

## 2021-09-22 ENCOUNTER — APPOINTMENT (OUTPATIENT)
Dept: GASTROENTEROLOGY | Facility: HOSPITAL | Age: 46
End: 2021-09-22

## 2021-09-22 LAB — SARS-COV-2 N GENE NPH QL NAA+PROBE: NOT DETECTED

## 2021-09-23 ENCOUNTER — OUTPATIENT (OUTPATIENT)
Dept: OUTPATIENT SERVICES | Facility: HOSPITAL | Age: 46
LOS: 1 days | Discharge: HOME | End: 2021-09-23
Payer: MEDICAID

## 2021-09-23 ENCOUNTER — RESULT REVIEW (OUTPATIENT)
Age: 46
End: 2021-09-23

## 2021-09-23 DIAGNOSIS — Z98.890 OTHER SPECIFIED POSTPROCEDURAL STATES: Chronic | ICD-10-CM

## 2021-09-23 DIAGNOSIS — Z90.710 ACQUIRED ABSENCE OF BOTH CERVIX AND UTERUS: Chronic | ICD-10-CM

## 2021-09-23 DIAGNOSIS — Z98.891 HISTORY OF UTERINE SCAR FROM PREVIOUS SURGERY: Chronic | ICD-10-CM

## 2021-09-23 DIAGNOSIS — Z41.9 ENCOUNTER FOR PROCEDURE FOR PURPOSES OTHER THAN REMEDYING HEALTH STATE, UNSPECIFIED: Chronic | ICD-10-CM

## 2021-09-23 PROCEDURE — 88342 IMHCHEM/IMCYTCHM 1ST ANTB: CPT | Mod: 26

## 2021-09-23 PROCEDURE — 88341 IMHCHEM/IMCYTCHM EA ADD ANTB: CPT | Mod: 26

## 2021-09-23 PROCEDURE — 88305 TISSUE EXAM BY PATHOLOGIST: CPT

## 2021-09-23 PROCEDURE — 43239 EGD BIOPSY SINGLE/MULTIPLE: CPT | Mod: GC

## 2021-09-23 PROCEDURE — 43239 EGD BIOPSY SINGLE/MULTIPLE: CPT

## 2021-09-23 PROCEDURE — 88305 TISSUE EXAM BY PATHOLOGIST: CPT | Mod: 26

## 2021-09-23 PROCEDURE — 88341 IMHCHEM/IMCYTCHM EA ADD ANTB: CPT

## 2021-09-27 LAB — SURGICAL PATHOLOGY STUDY: SIGNIFICANT CHANGE UP

## 2021-10-06 ENCOUNTER — APPOINTMENT (OUTPATIENT)
Dept: BARIATRICS | Facility: CLINIC | Age: 46
End: 2021-10-06
Payer: MEDICAID

## 2021-10-06 VITALS
TEMPERATURE: 97.8 F | WEIGHT: 144.25 LBS | HEIGHT: 66.5 IN | HEART RATE: 67 BPM | OXYGEN SATURATION: 99 % | DIASTOLIC BLOOD PRESSURE: 88 MMHG | SYSTOLIC BLOOD PRESSURE: 126 MMHG | BODY MASS INDEX: 22.91 KG/M2

## 2021-10-06 DIAGNOSIS — R11.15 CYCLICAL VOMITING SYNDROME UNRELATED TO MIGRAINE: ICD-10-CM

## 2021-10-06 PROBLEM — I10 ESSENTIAL HYPERTENSION: Status: ACTIVE | Noted: 2020-04-21

## 2021-10-06 PROCEDURE — 99213 OFFICE O/P EST LOW 20 MIN: CPT

## 2021-10-06 NOTE — PLAN
[FreeTextEntry1] : Believe that she is not tolerating high pressured sleeve as evidence by progression of solids on UGI and no significant mechanical obstruction on EGD. My recommendation would be to repair the hiatal hernia and convert to a low pressure gastric bypass with dependent anastomosis of adequate size and short limb lengths. Feel that despite low BMI, repairing obstruction, will allow adequate diet and be overall nutritionally beneficial. Other options such as stent i.e no mechanical obstruction or repair of hernia are poor options of high failure rate in this patient. Explained in great detail to patient.

## 2021-10-06 NOTE — HISTORY OF PRESENT ILLNESS
[de-identified] : Patient 14 months s/p sleeve gastrectomy with persistent emesis, multiple times a day, more than 100 lbs weight loss, with recurrent visits to ED. Was lost to follow up since December 202. She was seen at outside institutions and was told that she had a stricture in her gastric sleeve. Workup at [facility] included UGI (9/20/2021) showing type 3 hiatal hernia with poor progression of solid pill. EGD (9/23/2021) revealed normal gastric sleeve anatomy, grade A Esophagitis, hiatal hernia, no focal obstruction and no stricture. Patient without any other major life stressors, well educated, well spoken and no markers of potential eating disorder contributing to her symptoms. Able to consume liquids and soft pureed foods. Patient is very happy with her weight loss and does not want to regain any weight.

## 2021-10-06 NOTE — ASSESSMENT
[FreeTextEntry1] : Saw patient with Dr. Ruth. Appears patient is not able to tolerate the high pressure created by her gastric sleeve.  Discussed with the patient the option of converting her gastric sleeve to a gastric bypass to create a low pressure system. This will be the only way for her to resume a normal diet. A hiatal hernia repair will be performed at the same time.  Discussed the risks and benefits of the procedure with the patient, and she understands this information fully.

## 2021-10-06 NOTE — REVIEW OF SYSTEMS
[Vomiting] : vomiting [Constipation] : no constipation [Diarrhea] : no diarrhea [Reflux/Heartburn] : no reflux/ heartburn [Hernia] : no hernia [Negative] : Psychiatric

## 2021-10-06 NOTE — HISTORY OF PRESENT ILLNESS
[de-identified] : Patient 14 months s/p sleeve gastrectomy with persistent emesis, multiple times a day, more than 100 lbs weight loss, with recurrent visits to ED. Was lost to follow up since December 202. She was seen at outside institutions and was told that she had a stricture in her gastric sleeve. Workup at [facility] included UGI (9/20/2021) showing type 3 hiatal hernia with poor progression of solid pill. EGD (9/23/2021) revealed normal gastric sleeve anatomy, grade A Esophagitis, hiatal hernia, no focal obstruction and no stricture. Patient without any other major life stressors, well educated, well spoken and no markers of potential eating disorder contributing to her symptoms. Able to consume liquids and soft pureed foods. Patient is very happy with her weight loss and does not want to regain any weight.

## 2021-10-07 DIAGNOSIS — K44.9 DIAPHRAGMATIC HERNIA W/OUT OBSTRUCTION OR GANGRENE: ICD-10-CM

## 2021-10-14 ENCOUNTER — RESULT REVIEW (OUTPATIENT)
Age: 46
End: 2021-10-14

## 2021-10-14 ENCOUNTER — OUTPATIENT (OUTPATIENT)
Dept: OUTPATIENT SERVICES | Facility: HOSPITAL | Age: 46
LOS: 1 days | End: 2021-10-14
Payer: MEDICAID

## 2021-10-14 DIAGNOSIS — Z41.9 ENCOUNTER FOR PROCEDURE FOR PURPOSES OTHER THAN REMEDYING HEALTH STATE, UNSPECIFIED: Chronic | ICD-10-CM

## 2021-10-14 DIAGNOSIS — Z90.710 ACQUIRED ABSENCE OF BOTH CERVIX AND UTERUS: Chronic | ICD-10-CM

## 2021-10-14 DIAGNOSIS — Z98.890 OTHER SPECIFIED POSTPROCEDURAL STATES: Chronic | ICD-10-CM

## 2021-10-14 DIAGNOSIS — Z98.891 HISTORY OF UTERINE SCAR FROM PREVIOUS SURGERY: Chronic | ICD-10-CM

## 2021-10-14 DIAGNOSIS — Z01.818 ENCOUNTER FOR OTHER PREPROCEDURAL EXAMINATION: ICD-10-CM

## 2021-10-14 LAB
ALBUMIN SERPL ELPH-MCNC: 4.3 G/DL — SIGNIFICANT CHANGE UP (ref 3.3–5)
ALP SERPL-CCNC: 79 U/L — SIGNIFICANT CHANGE UP (ref 40–120)
ALT FLD-CCNC: 11 U/L — SIGNIFICANT CHANGE UP (ref 10–45)
ANION GAP SERPL CALC-SCNC: 10 MMOL/L — SIGNIFICANT CHANGE UP (ref 5–17)
APPEARANCE UR: ABNORMAL
APTT BLD: 28.9 SEC — SIGNIFICANT CHANGE UP (ref 27.5–35.5)
AST SERPL-CCNC: 18 U/L — SIGNIFICANT CHANGE UP (ref 10–40)
BACTERIA # UR AUTO: ABNORMAL /HPF
BASOPHILS # BLD AUTO: 0.04 K/UL — SIGNIFICANT CHANGE UP (ref 0–0.2)
BASOPHILS NFR BLD AUTO: 0.9 % — SIGNIFICANT CHANGE UP (ref 0–2)
BILIRUB SERPL-MCNC: 0.3 MG/DL — SIGNIFICANT CHANGE UP (ref 0.2–1.2)
BILIRUB UR-MCNC: NEGATIVE — SIGNIFICANT CHANGE UP
BUN SERPL-MCNC: 5 MG/DL — LOW (ref 7–23)
CALCIUM SERPL-MCNC: 9.4 MG/DL — SIGNIFICANT CHANGE UP (ref 8.4–10.5)
CHLORIDE SERPL-SCNC: 104 MMOL/L — SIGNIFICANT CHANGE UP (ref 96–108)
CO2 SERPL-SCNC: 29 MMOL/L — SIGNIFICANT CHANGE UP (ref 22–31)
COLOR SPEC: YELLOW — SIGNIFICANT CHANGE UP
COMMENT - URINE: SIGNIFICANT CHANGE UP
CREAT SERPL-MCNC: 0.61 MG/DL — SIGNIFICANT CHANGE UP (ref 0.5–1.3)
DIFF PNL FLD: NEGATIVE — SIGNIFICANT CHANGE UP
EOSINOPHIL # BLD AUTO: 0.02 K/UL — SIGNIFICANT CHANGE UP (ref 0–0.5)
EOSINOPHIL NFR BLD AUTO: 0.5 % — SIGNIFICANT CHANGE UP (ref 0–6)
EPI CELLS # UR: SIGNIFICANT CHANGE UP /HPF (ref 0–5)
GLUCOSE SERPL-MCNC: 73 MG/DL — SIGNIFICANT CHANGE UP (ref 70–99)
GLUCOSE UR QL: NEGATIVE — SIGNIFICANT CHANGE UP
HCT VFR BLD CALC: 40.7 % — SIGNIFICANT CHANGE UP (ref 34.5–45)
HGB BLD-MCNC: 13.5 G/DL — SIGNIFICANT CHANGE UP (ref 11.5–15.5)
IMM GRANULOCYTES NFR BLD AUTO: 0.2 % — SIGNIFICANT CHANGE UP (ref 0–1.5)
INR BLD: 1.03 — SIGNIFICANT CHANGE UP (ref 0.88–1.16)
KETONES UR-MCNC: NEGATIVE — SIGNIFICANT CHANGE UP
LEUKOCYTE ESTERASE UR-ACNC: NEGATIVE — SIGNIFICANT CHANGE UP
LYMPHOCYTES # BLD AUTO: 1.77 K/UL — SIGNIFICANT CHANGE UP (ref 1–3.3)
LYMPHOCYTES # BLD AUTO: 41 % — SIGNIFICANT CHANGE UP (ref 13–44)
MCHC RBC-ENTMCNC: 32 PG — SIGNIFICANT CHANGE UP (ref 27–34)
MCHC RBC-ENTMCNC: 33.2 GM/DL — SIGNIFICANT CHANGE UP (ref 32–36)
MCV RBC AUTO: 96.4 FL — SIGNIFICANT CHANGE UP (ref 80–100)
MONOCYTES # BLD AUTO: 0.28 K/UL — SIGNIFICANT CHANGE UP (ref 0–0.9)
MONOCYTES NFR BLD AUTO: 6.5 % — SIGNIFICANT CHANGE UP (ref 2–14)
NEUTROPHILS # BLD AUTO: 2.2 K/UL — SIGNIFICANT CHANGE UP (ref 1.8–7.4)
NEUTROPHILS NFR BLD AUTO: 50.9 % — SIGNIFICANT CHANGE UP (ref 43–77)
NITRITE UR-MCNC: POSITIVE
NRBC # BLD: 0 /100 WBCS — SIGNIFICANT CHANGE UP (ref 0–0)
PH UR: 7 — SIGNIFICANT CHANGE UP (ref 5–8)
PLATELET # BLD AUTO: 249 K/UL — SIGNIFICANT CHANGE UP (ref 150–400)
POTASSIUM SERPL-MCNC: 3.8 MMOL/L — SIGNIFICANT CHANGE UP (ref 3.5–5.3)
POTASSIUM SERPL-SCNC: 3.8 MMOL/L — SIGNIFICANT CHANGE UP (ref 3.5–5.3)
PROT SERPL-MCNC: 7.5 G/DL — SIGNIFICANT CHANGE UP (ref 6–8.3)
PROT UR-MCNC: NEGATIVE MG/DL — SIGNIFICANT CHANGE UP
PROTHROM AB SERPL-ACNC: 12.3 SEC — SIGNIFICANT CHANGE UP (ref 10.6–13.6)
RBC # BLD: 4.22 M/UL — SIGNIFICANT CHANGE UP (ref 3.8–5.2)
RBC # FLD: 13.8 % — SIGNIFICANT CHANGE UP (ref 10.3–14.5)
RBC CASTS # UR COMP ASSIST: < 5 /HPF — SIGNIFICANT CHANGE UP
SODIUM SERPL-SCNC: 143 MMOL/L — SIGNIFICANT CHANGE UP (ref 135–145)
SP GR SPEC: 1.01 — SIGNIFICANT CHANGE UP (ref 1–1.03)
UROBILINOGEN FLD QL: 1 E.U./DL — SIGNIFICANT CHANGE UP
WBC # BLD: 4.32 K/UL — SIGNIFICANT CHANGE UP (ref 3.8–10.5)
WBC # FLD AUTO: 4.32 K/UL — SIGNIFICANT CHANGE UP (ref 3.8–10.5)
WBC UR QL: > 10 /HPF

## 2021-10-14 PROCEDURE — 93005 ELECTROCARDIOGRAM TRACING: CPT

## 2021-10-14 PROCEDURE — 80053 COMPREHEN METABOLIC PANEL: CPT

## 2021-10-14 PROCEDURE — 81001 URINALYSIS AUTO W/SCOPE: CPT

## 2021-10-14 PROCEDURE — 87186 SC STD MICRODIL/AGAR DIL: CPT

## 2021-10-14 PROCEDURE — 87086 URINE CULTURE/COLONY COUNT: CPT

## 2021-10-14 PROCEDURE — 85025 COMPLETE CBC W/AUTO DIFF WBC: CPT

## 2021-10-14 PROCEDURE — 85730 THROMBOPLASTIN TIME PARTIAL: CPT

## 2021-10-14 PROCEDURE — 93010 ELECTROCARDIOGRAM REPORT: CPT

## 2021-10-14 PROCEDURE — 71046 X-RAY EXAM CHEST 2 VIEWS: CPT | Mod: 26

## 2021-10-14 PROCEDURE — 71046 X-RAY EXAM CHEST 2 VIEWS: CPT

## 2021-10-14 PROCEDURE — 85610 PROTHROMBIN TIME: CPT

## 2021-10-16 LAB
-  AMPICILLIN/SULBACTAM: SIGNIFICANT CHANGE UP
-  AMPICILLIN: SIGNIFICANT CHANGE UP
-  CEFAZOLIN: SIGNIFICANT CHANGE UP
-  CEFTRIAXONE: SIGNIFICANT CHANGE UP
-  CIPROFLOXACIN: SIGNIFICANT CHANGE UP
-  ERTAPENEM: SIGNIFICANT CHANGE UP
-  GENTAMICIN: SIGNIFICANT CHANGE UP
-  NITROFURANTOIN: SIGNIFICANT CHANGE UP
-  PIPERACILLIN/TAZOBACTAM: SIGNIFICANT CHANGE UP
-  TOBRAMYCIN: SIGNIFICANT CHANGE UP
-  TRIMETHOPRIM/SULFAMETHOXAZOLE: SIGNIFICANT CHANGE UP
CULTURE RESULTS: SIGNIFICANT CHANGE UP
METHOD TYPE: SIGNIFICANT CHANGE UP
ORGANISM # SPEC MICROSCOPIC CNT: SIGNIFICANT CHANGE UP
ORGANISM # SPEC MICROSCOPIC CNT: SIGNIFICANT CHANGE UP
SPECIMEN SOURCE: SIGNIFICANT CHANGE UP

## 2021-10-20 ENCOUNTER — APPOINTMENT (OUTPATIENT)
Dept: BARIATRICS | Facility: CLINIC | Age: 46
End: 2021-10-20
Payer: MEDICAID

## 2021-10-20 VITALS
BODY MASS INDEX: 23.03 KG/M2 | OXYGEN SATURATION: 98 % | SYSTOLIC BLOOD PRESSURE: 120 MMHG | HEIGHT: 66.5 IN | HEART RATE: 83 BPM | TEMPERATURE: 96.1 F | WEIGHT: 145 LBS | DIASTOLIC BLOOD PRESSURE: 86 MMHG

## 2021-10-20 DIAGNOSIS — K90.49 MALABSORPTION DUE TO INTOLERANCE, NOT ELSEWHERE CLASSIFIED: ICD-10-CM

## 2021-10-20 DIAGNOSIS — Z98.84 BARIATRIC SURGERY STATUS: ICD-10-CM

## 2021-10-20 PROCEDURE — 99212 OFFICE O/P EST SF 10 MIN: CPT

## 2021-10-26 ENCOUNTER — NON-APPOINTMENT (OUTPATIENT)
Age: 46
End: 2021-10-26

## 2021-11-05 ENCOUNTER — NON-APPOINTMENT (OUTPATIENT)
Age: 46
End: 2021-11-05

## 2021-11-08 ENCOUNTER — APPOINTMENT (OUTPATIENT)
Dept: BARIATRICS | Facility: HOSPITAL | Age: 46
End: 2021-11-08

## 2021-11-13 PROBLEM — Z98.84 S/P LAPAROSCOPIC SLEEVE GASTRECTOMY: Status: ACTIVE | Noted: 2020-10-01

## 2021-11-13 PROBLEM — K90.49 FOOD INTOLERANCE: Status: ACTIVE | Noted: 2021-10-06

## 2021-11-13 NOTE — HISTORY OF PRESENT ILLNESS
[de-identified] : Patient is scheduled for a laparoscopic conversion from her sleeve gastrectomy to a Mena-en-Y gastric bypass to treat her swallowing difficulties and poor PO intake. The procedure was described to her, and she understands that the plan is to make a mildly restricted gastric pouch and a limited intestinal bypass to minimize malabsorption. She has not yet been cleared by her PCP and is still going through the required preoperative evaluation.

## 2022-05-28 NOTE — ED ADULT TRIAGE NOTE - CHIEF COMPLAINT QUOTE
PT presents to ED w/ c/o N/V, unable to keep things down s/p gastric sleeve done by MD Kowalski on 8/18/20202. Pt states dehydrated, dark urine, feeling weak. Denies fever/chills.
Normal rate, regular rhythm.  Heart sounds S1, S2.  No murmurs, rubs or gallops.